# Patient Record
Sex: MALE | Race: OTHER | Employment: OTHER | URBAN - METROPOLITAN AREA
[De-identification: names, ages, dates, MRNs, and addresses within clinical notes are randomized per-mention and may not be internally consistent; named-entity substitution may affect disease eponyms.]

---

## 2022-10-11 ENCOUNTER — HOSPITAL ENCOUNTER (OUTPATIENT)
Dept: GENERAL RADIOLOGY | Age: 72
Discharge: HOME | End: 2022-10-11
Payer: MEDICARE

## 2022-10-11 ENCOUNTER — TELEPHONE (OUTPATIENT)
Dept: FAMILY MEDICINE CLINIC | Age: 72
End: 2022-10-11

## 2022-10-11 ENCOUNTER — OFFICE VISIT (OUTPATIENT)
Dept: FAMILY MEDICINE CLINIC | Age: 72
End: 2022-10-11
Payer: MEDICARE

## 2022-10-11 VITALS
WEIGHT: 156 LBS | OXYGEN SATURATION: 98 % | BODY MASS INDEX: 24.48 KG/M2 | DIASTOLIC BLOOD PRESSURE: 80 MMHG | HEART RATE: 68 BPM | SYSTOLIC BLOOD PRESSURE: 138 MMHG | HEIGHT: 67 IN

## 2022-10-11 DIAGNOSIS — R05.1 ACUTE COUGH: Primary | ICD-10-CM

## 2022-10-11 DIAGNOSIS — R05.1 ACUTE COUGH: ICD-10-CM

## 2022-10-11 PROCEDURE — 71046 X-RAY EXAM CHEST 2 VIEWS: CPT

## 2022-10-11 PROCEDURE — G8484 FLU IMMUNIZE NO ADMIN: HCPCS | Performed by: FAMILY MEDICINE

## 2022-10-11 PROCEDURE — 3017F COLORECTAL CA SCREEN DOC REV: CPT | Performed by: FAMILY MEDICINE

## 2022-10-11 PROCEDURE — G8427 DOCREV CUR MEDS BY ELIG CLIN: HCPCS | Performed by: FAMILY MEDICINE

## 2022-10-11 PROCEDURE — G8420 CALC BMI NORM PARAMETERS: HCPCS | Performed by: FAMILY MEDICINE

## 2022-10-11 PROCEDURE — 1036F TOBACCO NON-USER: CPT | Performed by: FAMILY MEDICINE

## 2022-10-11 PROCEDURE — 99203 OFFICE O/P NEW LOW 30 MIN: CPT | Performed by: FAMILY MEDICINE

## 2022-10-11 PROCEDURE — 1123F ACP DISCUSS/DSCN MKR DOCD: CPT | Performed by: FAMILY MEDICINE

## 2022-10-11 RX ORDER — TAMSULOSIN HYDROCHLORIDE 0.4 MG/1
0.4 CAPSULE ORAL DAILY
COMMUNITY

## 2022-10-11 RX ORDER — ATORVASTATIN CALCIUM 20 MG/1
20 TABLET, FILM COATED ORAL DAILY
COMMUNITY

## 2022-10-11 SDOH — ECONOMIC STABILITY: FOOD INSECURITY: WITHIN THE PAST 12 MONTHS, YOU WORRIED THAT YOUR FOOD WOULD RUN OUT BEFORE YOU GOT MONEY TO BUY MORE.: NEVER TRUE

## 2022-10-11 SDOH — ECONOMIC STABILITY: FOOD INSECURITY: WITHIN THE PAST 12 MONTHS, THE FOOD YOU BOUGHT JUST DIDN'T LAST AND YOU DIDN'T HAVE MONEY TO GET MORE.: NEVER TRUE

## 2022-10-11 ASSESSMENT — PATIENT HEALTH QUESTIONNAIRE - PHQ9
SUM OF ALL RESPONSES TO PHQ QUESTIONS 1-9: 0
1. LITTLE INTEREST OR PLEASURE IN DOING THINGS: 0
SUM OF ALL RESPONSES TO PHQ9 QUESTIONS 1 & 2: 0
SUM OF ALL RESPONSES TO PHQ QUESTIONS 1-9: 0
2. FEELING DOWN, DEPRESSED OR HOPELESS: 0
SUM OF ALL RESPONSES TO PHQ QUESTIONS 1-9: 0
SUM OF ALL RESPONSES TO PHQ QUESTIONS 1-9: 0

## 2022-10-11 ASSESSMENT — SOCIAL DETERMINANTS OF HEALTH (SDOH): HOW HARD IS IT FOR YOU TO PAY FOR THE VERY BASICS LIKE FOOD, HOUSING, MEDICAL CARE, AND HEATING?: NOT HARD AT ALL

## 2022-10-11 NOTE — TELEPHONE ENCOUNTER
Pt rc.  Normal Chest X-Ray labs given to pt. Pt requesting to speak to Clinical Staff but no one answered. Please call pt.

## 2022-10-11 NOTE — PROGRESS NOTES
Mohsen Covarrubias (:  1950) is a 81 y.o. male,New patient, here for evaluation of the following chief complaint(s):  New Patient (Patient needs new PCP, cough x 1 month (taking \"cough syrup), complains of chest congestion he can \"hear\" when he coughs.) and Colonoscopy (Dr. Eddy Pizano in [facility] (approx. 3 years ago))         ASSESSMENT/PLAN:  Sarkis was seen today for new patient and colonoscopy. Diagnoses and all orders for this visit:    Acute cough  -     XR CHEST (2 VW); Future  Reviewed diagnosis of bronchitis and differential diagnosis, including post infectious cough. Prescription medications for symptom relief reviewed, as well as their possible side effects and adverse effects. Supportive care including rest and otc treatments (honey, lemon, tea, humidified air) reviewed. Call if worsening cough or chest pain, fever, chills, or myalgias develop. No follow-ups on file. Subjective   SUBJECTIVE/OBJECTIVE:  HPI  Pt is a of 81 y.o. male comes in today with   Chief Complaint   Patient presents with    New Patient     Patient needs new PCP, cough x 1 month (taking \"cough syrup), complains of chest congestion he can \"hear\" when he coughs. Colonoscopy     Dr. Eddy Pizano in [facility] (approx. 3 years ago)     For the past month has had a lot of cough  Started with runny nose  No sore throat. No sputum. No shortness of breath. On statin for hyperlipidemia  Bph stable on flomax.     Vitals:    10/11/22 0845   BP: 138/80   Site: Left Upper Arm   Position: Sitting   Cuff Size: Small Adult   Pulse: 68   SpO2: 98%   Weight: 156 lb (70.8 kg)   Height: 5' 7\" (1.702 m)     No Known Allergies    Current Outpatient Medications on File Prior to Visit   Medication Sig Dispense Refill    atorvastatin (LIPITOR) 20 MG tablet Take 20 mg by mouth daily      tamsulosin (FLOMAX) 0.4 MG capsule Take 0.4 mg by mouth daily       No current facility-administered medications on file prior to visit. Past Medical History:   Diagnosis Date    Enlarged prostate     Hyperlipidemia        Past Surgical History:   Procedure Laterality Date    SPINE SURGERY  2017       Social History     Socioeconomic History    Marital status: Unknown     Spouse name: None    Number of children: None    Years of education: None    Highest education level: None   Tobacco Use    Smoking status: Never    Smokeless tobacco: Never   Vaping Use    Vaping Use: Never used   Substance and Sexual Activity    Alcohol use: Never    Drug use: Never     Social Determinants of Health     Financial Resource Strain: Low Risk     Difficulty of Paying Living Expenses: Not hard at all   Food Insecurity: No Food Insecurity    Worried About Running Out of Food in the Last Year: Never true    Ran Out of Food in the Last Year: Never true       Social History     Substance and Sexual Activity   Drug Use Never       History reviewed. No pertinent family history. Review of Systems       Objective   Physical Exam  Constitutional:       General: He is not in acute distress. Appearance: Normal appearance. He is well-developed. He is not diaphoretic. HENT:      Head: Normocephalic and atraumatic. Mouth/Throat:      Mouth: Mucous membranes are moist.      Pharynx: Oropharynx is clear. Eyes:      General: No scleral icterus. Neck:      Thyroid: No thyromegaly. Trachea: No tracheal deviation. Cardiovascular:      Rate and Rhythm: Normal rate and regular rhythm. Heart sounds: Normal heart sounds. No murmur heard. Pulmonary:      Effort: Pulmonary effort is normal.      Breath sounds: Normal breath sounds. No wheezing or rales. Musculoskeletal:      Cervical back: Normal range of motion and neck supple. Lymphadenopathy:      Head:      Right side of head: No submental or submandibular adenopathy. Left side of head: No submental or submandibular adenopathy. Cervical: No cervical adenopathy.    Skin: General: Skin is warm and dry. Neurological:      Mental Status: He is alert and oriented to person, place, and time. Cranial Nerves: No cranial nerve deficit. Psychiatric:         Behavior: Behavior normal.         Thought Content: Thought content normal.         Judgment: Judgment normal.            An electronic signature was used to authenticate this note.     --Melissa Hutchinson MD

## 2022-10-12 NOTE — TELEPHONE ENCOUNTER
Patient just needed to know if he needed to schedule a follow up, informed him not at this time. Advised him to call back if there was anything we could help with.

## 2022-11-29 ENCOUNTER — OFFICE VISIT (OUTPATIENT)
Dept: ORTHOPEDIC SURGERY | Age: 72
End: 2022-11-29
Payer: MEDICARE

## 2022-11-29 VITALS — HEIGHT: 67 IN | WEIGHT: 156 LBS | BODY MASS INDEX: 24.48 KG/M2

## 2022-11-29 DIAGNOSIS — M25.552 LEFT HIP PAIN: ICD-10-CM

## 2022-11-29 DIAGNOSIS — M54.50 CHRONIC LOW BACK PAIN WITHOUT SCIATICA, UNSPECIFIED BACK PAIN LATERALITY: ICD-10-CM

## 2022-11-29 DIAGNOSIS — G89.29 CHRONIC LOW BACK PAIN WITHOUT SCIATICA, UNSPECIFIED BACK PAIN LATERALITY: ICD-10-CM

## 2022-11-29 DIAGNOSIS — Z98.890 HISTORY OF LUMBAR SURGERY: Primary | ICD-10-CM

## 2022-11-29 PROCEDURE — G8484 FLU IMMUNIZE NO ADMIN: HCPCS | Performed by: PHYSICIAN ASSISTANT

## 2022-11-29 PROCEDURE — G8420 CALC BMI NORM PARAMETERS: HCPCS | Performed by: PHYSICIAN ASSISTANT

## 2022-11-29 PROCEDURE — 3017F COLORECTAL CA SCREEN DOC REV: CPT | Performed by: PHYSICIAN ASSISTANT

## 2022-11-29 PROCEDURE — 99204 OFFICE O/P NEW MOD 45 MIN: CPT | Performed by: PHYSICIAN ASSISTANT

## 2022-11-29 PROCEDURE — G8427 DOCREV CUR MEDS BY ELIG CLIN: HCPCS | Performed by: PHYSICIAN ASSISTANT

## 2022-11-29 PROCEDURE — 1036F TOBACCO NON-USER: CPT | Performed by: PHYSICIAN ASSISTANT

## 2022-11-29 PROCEDURE — 1123F ACP DISCUSS/DSCN MKR DOCD: CPT | Performed by: PHYSICIAN ASSISTANT

## 2022-11-29 RX ORDER — METHYLPREDNISOLONE 4 MG/1
TABLET ORAL
Qty: 1 KIT | Refills: 0 | Status: SHIPPED | OUTPATIENT
Start: 2022-11-29

## 2022-11-29 NOTE — PROGRESS NOTES
New Patient: SPINE    11/29/2022     CHIEF COMPLAINT:    Chief Complaint   Patient presents with    New Patient     LUMBAR/REF BY WIFE       HISTORY OF PRESENT ILLNESS:              The patient is a 81 y.o. male referred by . Eddie Edge whom is also my patient for chronic back pain. He has a history of a posterior L4-5 fusion in 2015 in [place]. He reports chronic residual left greater than right low back pain. Periodically he will experience some radiating pain into his left lower extremity. His symptoms have increased over the last 1 year. His pain is constant but increased with walking, bending or lifting. Some relief with changing position. Conservative care includes NSAIDs, remote SHANICE in December 2021 with some improvement in [place]. He currently denies any distal radiating pain. Denies any progressive numbness tingling or extremity weakness. No recent bowel or bladder dysfunction or saddle anesthesia. He reports chronic urinary issues due to prostate enlargement over the last few years. No recent fevers chills or infections. No recent injury or trauma. Past Medical History:   Diagnosis Date    Enlarged prostate     Hyperlipidemia         The pain assessment was noted & reviewed in the medical record today.      Current/Past Treatment:   Physical Therapy: past  Chiropractic:     Injection:   SHANICE 12/2021 in [place] w/relief   Medications:            NSAIDS: IBU            Muscle relaxer:              Steriods:              Neuropathic medications:              Opioids:            Other:   Surgery/Consult: Status post L4-5 fusion 2015 in [place]    Work Status/Functionality: Retired     Past Medical History: Medical history form was reviewed today & scanned into the media tab  Past Medical History:   Diagnosis Date    Enlarged prostate     Hyperlipidemia       Past Surgical History:     Past Surgical History:   Procedure Laterality Date    SPINE SURGERY  2017     Current Medications: Current Outpatient Medications:     atorvastatin (LIPITOR) 20 MG tablet, Take 20 mg by mouth daily, Disp: , Rfl:     tamsulosin (FLOMAX) 0.4 MG capsule, Take 0.4 mg by mouth daily, Disp: , Rfl:   Allergies:  Patient has no known allergies. Social History:    reports that he has never smoked. He has never used smokeless tobacco. He reports that he does not drink alcohol and does not use drugs. Family History:   No family history on file. REVIEW OF SYSTEMS: Full ROS reviewed & scanned into chart  CONSTITUTIONAL: Denies unexplained weight loss, fevers   SKIN: Denies active skin conditions   ENT: Denies dizziness, nosebleeds  RESPIRATORY: Denies difficulty breathing  CARDIOVASCULAR: Denies new chest pain   NEUROLOGICAL: Denies progressive weakness  PSYCHOLOGICAL: Denies anxiety, depression   HEMATOLOGIC: Denies blood disorders, cancer  ENDOCRINE: Denies excessive thirst, heat/cold  GI: Denies current nausea, vomiting, diarrhea   : Denies new bowel or bladder incontinence       PHYSICAL EXAM:    Vitals: Height 5' 7\" (1.702 m), weight 156 lb (70.8 kg). Pain score 3/10, seated    GENERAL EXAM:  General Apparence: Patient is adequately groomed with no evidence of malnutrition. Orientation: The patient is oriented to time, place and person. Mood & Affect:The patient's mood and affect are appropriate   Vascular: Examination reveals no swelling tenderness in upper or lower extremities. Good capillary refill  Lymphatic: The lymphatic examination bilaterally reveals all areas to be without enlargement or induration  Sensation: Sensation is intact without deficit  Coordination/Balance: Good coordination     LUMBAR/SACRAL EXAMINATION:  Inspection: Local inspection shows no step-off or bruising. Lumbar alignment is normal.  Sagittal and Coronal balance is neutral.      Palpation:   Tenderness right and left of midline at L5-S1.   Positive bilateral SI tenderness  Range of Motion: Intact flexion moderate to severely limited extension more pain with extension  Strength:   Strength testing is 5/5 in all muscle groups tested. Special Tests:   Straight leg raise and crossed SLR negative. Leg length and pelvis level.  0 out of 5 Haley's signs. Skin: There are no rashes, ulcerations or lesions. Reflexes: Reflexes are symmetrically 2+ at the patellar and ankle tendons. Clonus absent bilaterally at the feet. Gait & station: Slightly forward unassisted  Additional Examinations:   RIGHT LOWER EXTREMITY: Inspection/examination of the right lower extremity does not show any tenderness, deformity or injury. Range of motion is full. There is no gross instability. There are no rashes, ulcerations or lesions. Strength and tone are normal.  LEFT LOWER EXTREMITY:  Inspection/examination of the left lower extremity does not show any tenderness, deformity or injury. Range of motion is full. There is no gross instability. There are no rashes, ulcerations or lesions. Strength and tone are normal.    Diagnostic Testin views lumbar spine 2022 L4-5 posterior fusion, L5-S1 DDD/spondylosis. No high-grade instability on flexion-extension. Questionable sclerotic rounded lesion left superior pubic ramus--we will order dedicated hip film    Chest x-ray 2022 showed no acute abnormality of the chest    PCP notes reviewed        Impression:  1) Chronic worsening LB/buttock pain, L>R  2) L5-S1 DDD  3) S/p L4-5 fusion-2015 in [address]:   1) We had a long discussion. We obtained and reviewed 4 views of his lumbar spine today and discussed in detail. Given chronic worsening pain I recommend lumbar MRI W&WO for further evaluation. 2) Left hip xrays reference left superior pubic ramus  3) MDP--d/c NSAIDs during.   Side effects/risk discussed  4) PT for HEP review  5) Discussed concerning signs and symptoms  6) SHANICE pamphlet provided  7) F/u to review L MRI t/c [address] SHANICE #1 if warranted            Elana Harris ROULA, MPAS  Board Certified by the [address]

## 2022-12-02 ENCOUNTER — HOSPITAL ENCOUNTER (OUTPATIENT)
Dept: MRI IMAGING | Age: 72
Discharge: HOME | End: 2022-12-02
Payer: MEDICARE

## 2022-12-02 DIAGNOSIS — M54.50 CHRONIC LOW BACK PAIN WITHOUT SCIATICA, UNSPECIFIED BACK PAIN LATERALITY: ICD-10-CM

## 2022-12-02 DIAGNOSIS — G89.29 CHRONIC LOW BACK PAIN WITHOUT SCIATICA, UNSPECIFIED BACK PAIN LATERALITY: ICD-10-CM

## 2022-12-02 DIAGNOSIS — Z98.890 HISTORY OF LUMBAR SURGERY: ICD-10-CM

## 2022-12-02 PROCEDURE — 72158 MRI LUMBAR SPINE W/O & W/DYE: CPT

## 2022-12-02 PROCEDURE — 6360000004 HC RX CONTRAST MEDICATION: Performed by: PHYSICIAN ASSISTANT

## 2022-12-02 PROCEDURE — 2580000003 HC RX 258: Performed by: PHYSICIAN ASSISTANT

## 2022-12-02 PROCEDURE — A9579 GAD-BASE MR CONTRAST NOS,1ML: HCPCS | Performed by: PHYSICIAN ASSISTANT

## 2022-12-02 RX ORDER — SODIUM CHLORIDE 0.9 % (FLUSH) 0.9 %
5-40 SYRINGE (ML) INJECTION 2 TIMES DAILY
Status: DISCONTINUED | OUTPATIENT
Start: 2022-12-02 | End: 2022-12-03 | Stop reason: HOSPADM

## 2022-12-02 RX ADMIN — GADOTERIDOL 14 ML: 279.3 INJECTION, SOLUTION INTRAVENOUS at 10:33

## 2022-12-02 RX ADMIN — SODIUM CHLORIDE, PRESERVATIVE FREE 10 ML: 5 INJECTION INTRAVENOUS at 10:34

## 2022-12-05 ENCOUNTER — TELEPHONE (OUTPATIENT)
Dept: ORTHOPEDIC SURGERY | Age: 72
End: 2022-12-05

## 2022-12-05 ENCOUNTER — HOSPITAL ENCOUNTER (OUTPATIENT)
Dept: GENERAL RADIOLOGY | Age: 72
Discharge: HOME | End: 2022-12-05
Payer: MEDICARE

## 2022-12-05 DIAGNOSIS — M25.552 LEFT HIP PAIN: ICD-10-CM

## 2022-12-05 PROCEDURE — 73502 X-RAY EXAM HIP UNI 2-3 VIEWS: CPT

## 2022-12-05 NOTE — TELEPHONE ENCOUNTER
Called & left a voicemail for the patient informing him I was calling to let him know we got his MRI results, and wanted to get him scheduled for a follow up appointment. Patient may call back to schedule at [phone].

## 2022-12-07 ENCOUNTER — OFFICE VISIT (OUTPATIENT)
Dept: ORTHOPEDIC SURGERY | Age: 72
End: 2022-12-07
Payer: MEDICARE

## 2022-12-07 VITALS — WEIGHT: 156 LBS | BODY MASS INDEX: 24.48 KG/M2 | HEIGHT: 67 IN

## 2022-12-07 DIAGNOSIS — Z98.1 HISTORY OF LUMBAR FUSION: Primary | ICD-10-CM

## 2022-12-07 DIAGNOSIS — G89.29 CHRONIC BILATERAL LOW BACK PAIN WITH LEFT-SIDED SCIATICA: ICD-10-CM

## 2022-12-07 DIAGNOSIS — M48.062 LUMBAR STENOSIS WITH NEUROGENIC CLAUDICATION: ICD-10-CM

## 2022-12-07 DIAGNOSIS — M54.42 CHRONIC BILATERAL LOW BACK PAIN WITH LEFT-SIDED SCIATICA: ICD-10-CM

## 2022-12-07 PROCEDURE — 99214 OFFICE O/P EST MOD 30 MIN: CPT | Performed by: PHYSICIAN ASSISTANT

## 2022-12-07 PROCEDURE — 3017F COLORECTAL CA SCREEN DOC REV: CPT | Performed by: PHYSICIAN ASSISTANT

## 2022-12-07 PROCEDURE — G8420 CALC BMI NORM PARAMETERS: HCPCS | Performed by: PHYSICIAN ASSISTANT

## 2022-12-07 PROCEDURE — G8484 FLU IMMUNIZE NO ADMIN: HCPCS | Performed by: PHYSICIAN ASSISTANT

## 2022-12-07 PROCEDURE — 1036F TOBACCO NON-USER: CPT | Performed by: PHYSICIAN ASSISTANT

## 2022-12-07 PROCEDURE — G8427 DOCREV CUR MEDS BY ELIG CLIN: HCPCS | Performed by: PHYSICIAN ASSISTANT

## 2022-12-07 PROCEDURE — 1123F ACP DISCUSS/DSCN MKR DOCD: CPT | Performed by: PHYSICIAN ASSISTANT

## 2022-12-07 NOTE — TELEPHONE ENCOUNTER
Medication:   Requested Prescriptions     Pending Prescriptions Disp Refills    tamsulosin (FLOMAX) 0.4 MG capsule 90 capsule 0     Sig: Take 1 capsule by mouth daily        Last Filled:  ---------    Patient Phone Number: [phone] (home)     Last appt: 10/11/2022   Next appt: Visit date not found    Last OARRS: No flowsheet data found.

## 2022-12-07 NOTE — LETTER
[facility]    Please Schedule the following with: NELDA    Today's Date:  12/7/22     Patient: Shanna Murry     YOB: 1950    Patient Home Phone: [phone] (home)    Diagnosis: L4-5 fusion, severe central stenosis L3-4, left lumbar radiculitis    [x]LT     []RT     []CHANDRAKANT     []Midline    Levels: L3-4 #1    []Cervical SHANICE 95719, 04789  []L-MBB 70250, 45658  []SI Joint 55117   []C-FACET 86872, 38570, 59039  []L-FACET [id number], 37523  []Interlaminar SHANICE 46128     []HIP 32004    []C-MBB  [x]Transforaminal SHANICE 59764  []Neurotomy 88290, 30914, 42876    Attending Provider: Nadege Jacome PA-C    Injection Schedule for: At:   [address] [place] [address],[facility] Floor:                                               Pre-cert #:  Second Insurance:                 Pre-cert #:    Comments:  Follow up with FLORINA Bowen 2weeks after procedure: [phone]    SEDATION:       [] IV           [] ORAL     [] Blood Thinner:                 []Diabetic           []Antibiotic:               []Glaucoma:    [] Pacemaker/defib       [] Current Open Wounds, Lacerations or Sores     Allergies: No Known Allergies    Past Medical History:   Diagnosis Date    Enlarged prostate     Hyperlipidemia           Current Outpatient Medications:     methylPREDNISolone (MEDROL, ILIR,) 4 MG tablet, Take by mouth., Disp: 1 kit, Rfl: 0    atorvastatin (LIPITOR) 20 MG tablet, Take 20 mg by mouth daily, Disp: , Rfl:     tamsulosin (FLOMAX) 0.4 MG capsule, Take 0.4 mg by mouth daily, Disp: , Rfl:

## 2022-12-07 NOTE — PROGRESS NOTES
FOLLOW UP: SPINE    12/7/2022     CHIEF COMPLAINT:    Chief Complaint   Patient presents with    Follow-up     MRI & XR RESULTS       HISTORY OF PRESENT ILLNESS:              The patient is a 81 y.o. male here to review lumbar MRI for chronic worsening back pain. He has a history of a posterior L4-5 fusion in 2015 in [place]. He reports chronic residual left > right low back pain. Periodically he will experience some radiating pain into his left lower extremity. His symptoms have increased over the last 1 year. His pain is constant but increased with walking, bending or lifting. Some relief with changing position. Conservative care includes MDP, NSAIDs, remote SHANICE in December 2021 with some improvement in [place]. He does report some improvement at this time but still with daily pain limiting him functionally. He currently denies any contralateral radiating pain. Denies any progressive numbness tingling or extremity weakness. No recent bowel or bladder dysfunction or saddle anesthesia. He reports chronic urinary issues due to prostate enlargement over the last few years. No recent fevers chills or infections. No recent injury or trauma. Past Medical History:   Diagnosis Date    Enlarged prostate     Hyperlipidemia         The pain assessment was noted & reviewed in the medical record today.      Current/Past Treatment:   Physical Therapy: past  Chiropractic:     Injection:   SHANICE 12/2021 in [place] w/relief   Medications:            NSAIDS: IBU            Muscle relaxer:              Steriods: MDP with some improvement            Neuropathic medications:              Opioids:            Other:   Surgery/Consult: Status post L4-5 fusion 2015 in [place]    Work Status/Functionality: Retired     Past Medical History: Medical history form was reviewed today & scanned into the media tab  Past Medical History:   Diagnosis Date    Enlarged prostate     Hyperlipidemia       Past Surgical History: Past Surgical History:   Procedure Laterality Date    SPINE SURGERY  2017     Current Medications:     Current Outpatient Medications:     methylPREDNISolone (MEDROL, ILIR,) 4 MG tablet, Take by mouth., Disp: 1 kit, Rfl: 0    atorvastatin (LIPITOR) 20 MG tablet, Take 20 mg by mouth daily, Disp: , Rfl:     tamsulosin (FLOMAX) 0.4 MG capsule, Take 0.4 mg by mouth daily, Disp: , Rfl:   Allergies:  Patient has no known allergies. Social History:    reports that he has never smoked. He has never used smokeless tobacco. He reports that he does not drink alcohol and does not use drugs. Family History:   No family history on file. REVIEW OF SYSTEMS: Full ROS reviewed & scanned into chart  CONSTITUTIONAL: Denies unexplained weight loss, fevers   SKIN: Denies active skin conditions          PHYSICAL EXAM:    Vitals: Height 5' 7\" (1.702 m), weight 156 lb (70.8 kg). Pain score 0/10, seated    GENERAL EXAM:  General Apparence: Patient is adequately groomed with no evidence of malnutrition. Orientation: The patient is oriented to time, place and person. Mood & Affect:The patient's mood and affect are appropriate   Lymphatic: The lymphatic examination bilaterally reveals all areas to be without enlargement or induration  Sensation: Sensation is intact without deficit  Coordination/Balance: Good coordination     LUMBAR/SACRAL EXAMINATION:  Inspection: Local inspection shows no step-off or bruising. Lumbar alignment is normal.  Sagittal and Coronal balance is neutral.      Palpation: No focal tenderness today  Range of Motion: Intact flexion moderate to severely limited extension more pain with extension  Strength:   Strength testing is 5/5 in all muscle groups tested. Special Tests:   Straight leg raise and crossed SLR negative. Leg length and pelvis level.  0 out of 5 Haley's signs. Skin: There are no rashes, ulcerations or lesions. Reflexes: Reflexes are symmetrically 2+ at the patellar and ankle tendons. Clonus absent bilaterally at the feet. Gait & station: Slightly forward unassisted  Additional Examinations:   RIGHT LOWER EXTREMITY: Inspection/examination of the right lower extremity does not show any tenderness, deformity or injury. Range of motion is full. There is no gross instability. There are no rashes, ulcerations or lesions. Strength and tone are normal.  LEFT LOWER EXTREMITY:  Inspection/examination of the left lower extremity does not show any tenderness, deformity or injury. Range of motion is full. There is no gross instability. There are no rashes, ulcerations or lesions. Strength and tone are normal.    Diagnostic Testing:    Lumbar MRI scan report dependently reviewed showing L4-5 fusion, L3-4 DDD and disc bulging contributing to severe central stenosis and moderate to severe left foraminal stenosis    Left hip x-ray films and report independently reviewed 2022 shows mild hip OA    4 views lumbar spine 2022 L4-5 posterior fusion, L5-S1 DDD/spondylosis. No high-grade instability on flexion-extension. Questionable sclerotic rounded lesion left superior pubic ramus--we will order dedicated hip film    Chest x-ray 2022 showed no acute abnormality of the chest    PCP notes reviewed        Impression:  1) Chronic worsening LB/buttock pain, left lumbar radiculitis   2) Severe CS w/left FS L3-4  3) S/p L4-5 fusion- in [address] [place] [address]  4) remote SHANICE w/relief         Plan:   1) We reviewed his recent lumbar MRI and hip x-rays today and discussed conservative versus surgical treatment options in detail. He has elected to pursue a treatment course includin) Left L3-4 TX SHANICE #1. Procedure risk and benefits were discussed. Pamphlet provided for more information. We will order with NELDA.   3) PT for above, flexion based  4) Discussed concerning signs and symptoms  5) F/u 2wks after SHANICE for re-evaluation          Kevyn Cruz PA-C, MPAS  Board Certified by the [facility] and [address]

## 2022-12-08 RX ORDER — TAMSULOSIN HYDROCHLORIDE 0.4 MG/1
0.4 CAPSULE ORAL DAILY
Qty: 90 CAPSULE | Refills: 0 | Status: SHIPPED | OUTPATIENT
Start: 2022-12-08

## 2022-12-14 ENCOUNTER — HOSPITAL ENCOUNTER (OUTPATIENT)
Dept: PHYSICAL THERAPY | Age: 72
Setting detail: THERAPIES SERIES
Discharge: HOME | End: 2022-12-14
Payer: MEDICARE

## 2022-12-14 PROCEDURE — 97161 PT EVAL LOW COMPLEX 20 MIN: CPT

## 2022-12-14 PROCEDURE — 97112 NEUROMUSCULAR REEDUCATION: CPT

## 2022-12-14 PROCEDURE — 97110 THERAPEUTIC EXERCISES: CPT

## 2022-12-14 NOTE — FLOWSHEET NOTE
[facility], [address] [place] [address], [address] [place] [address]  Phone: [phone]   Fax:     [phone]    Physical Therapy Daily Treatment Note  Date:  2022    Patient Name:  Atif Chacko  \"Stan\"  :  1950  MRN: [id number]  Restrictions/Precautions:    Medical/Treatment Diagnosis Information:  Diagnosis: Z98.1 (ICD-10-CM) - History of lumbar fusion  M48.062 (ICD-10-CM) - Lumbar stenosis with neurogenic claudication  M54.42, G89.29 (ICD-10-CM) - Chronic bilateral low back pain with left-sided sciatica  Treatment Diagnosis: M54.50 Chronic low back pain  Insurance/Certification information:  PT Insurance Information: Medicare  Physician Information:   Medicare  Has the plan of care been signed (Y/N):        []  Yes  [x]  No     Date of Patient follow up with Physician:       Is this a Progress Report:     []  Yes  [x]  No        If Yes:  Date Range for reporting period:  Beginning   Ending    Progress report will be due (10 Rx or 30 days whichever is less):        Recertification will be due (POC Duration  / 90 days whichever is less):          Visit # Insurance Allowable Auth Required   1 Medicare []  Yes []  No        Functional Scale: FOTO Lumbar 40  Date assessed:       Latex Allergy:  [x]NO      []YES  Preferred Language for Healthcare:   [x]English       []other:      Pain level:  3-4/10     SUBJECTIVE:  See eval    OBJECTIVE: See eval  Observation:   Test measurements:      RESTRICTIONS/PRECAUTIONS: Lumbar fusion L4-L5     Exercises/Interventions:   ROM/stretches     LTR X30 bilat 14   [address] 10\"hx5 bilat 12/14   Hamstring stretch 30\"hx3 bilat 12/14 supine with strap   Piriformis stretch 30\"hx3 bilat 12/14 supine knee across chest                       Strengthening                                   Neuro re-education      Glut sets 5\"hx10 14 hook lying   TA iso 5\"hx10   hook lying            Manual Intervention             Prone PA      GISTM/STM      Lumbar Manip      SI Manip      Hip belt mobs      Hip LA distraction              Therapeutic Exercise and NMR EXR  [x] (86775) Provided verbal/tactile cueing for activities related to strengthening, flexibility, endurance, ROM  for improvements in proximal hip and core control with self care, mobility, lifting and ambulation.  [] (82559) Provided verbal/tactile cueing for activities related to improving balance, coordination, kinesthetic sense, posture, motor skill, proprioception  to assist with core control in self care, mobility, lifting, and ambulation.      Therapeutic Activities:    [x] (34271 or 85900) Provided verbal/tactile cueing for activities related to improving balance, coordination, kinesthetic sense, posture, motor skill, proprioception and motor activation to allow for proper function  with self care and ADLs  [] (03426) Provided training and instruction to the patient for proper core and proximal hip recruitment and positioning with ambulation re-education     Home Exercise Program:    [x] (80255) Reviewed/Progressed HEP activities related to strengthening, flexibility, endurance, ROM of core, proximal hip and LE for functional self-care, mobility, lifting and ambulation   [] (23571) Reviewed/Progressed HEP activities related to improving balance, coordination, kinesthetic sense, posture, motor skill, proprioception of core, proximal hip and LE for self care, mobility, lifting, and ambulation      Manual Treatments:  PROM / STM / Oscillations-Mobs:  G-I, II, III, IV (PA's, Inf., Post.)  [] (87035) Provided manual therapy to mobilize proximal hip and LS spine soft tissue/joints for the purpose of modulating pain, promoting relaxation,  increasing ROM, reducing/eliminating soft tissue swelling/inflammation/restriction, improving soft tissue extensibility and allowing for proper ROM for normal function with self care, mobility, lifting and ambulation. Modalities:       Charges:  Timed Code Treatment Minutes: 23'+eval   Total Treatment Minutes: 10:55-12:14       [x] EVAL (LOW) 00028 (typically 20 minutes face-to-face)  [] EVAL (MOD) 26077 (typically 30 minutes face-to-face)  [] EVAL (HIGH) 17889 (typically 45 minutes face-to-face)  [] RE-EVAL     [x] IX(13114) x 1    [] IONTO  [x] NMR (29260) x  1   [] VASO  [] Manual (79232) x      [] Other:  [] TA x      [] Mech Traction (18375)  [] ES(attended) (08992)      [] ES (un) (47243):     Goals:   Patient stated goal: Move and complete daily activities without pain     [] Progressing: [] Met: [] Not Met: [] Adjusted     Therapist goals for Patient:   Short Term Goals: To be achieved in: 2 weeks  1. Independent in HEP and progression per patient tolerance, in order to prevent re-injury. [] Progressing: [] Met: [] Not Met: [] Adjusted   2. Patient will have a decrease in pain to facilitate improvement in movement, function, and ADLs as indicated by Functional Deficits. [] Progressing: [] Met: [] Not Met: [] Adjusted     Long Term Goals: To be achieved in: 4 weeks  1. Patient will score 55 or higher FOTO lumbar spine indicating subject improvement in function   [] Progressing: [] Met: [] Not Met: [] Adjusted  2. Patient will demonstrate increased trunk AROM to 20 deg extension in order to return from bent over positions without increase in pain or limitations. [] Progressing: [] Met: [] Not Met: [] Adjusted  3. Patient will demonstrate an increase in hip strength to 4/5 bilaterally in order to go from sit to stand with proper sequencing and no increase in symptoms. [] Progressing: [] Met: [] Not Met: [] Adjusted  4. Patient will demonstrate trunk AROM that is 100% of normal and pain free with rotations and side bends in order to complete bed mobility without pain or limitations   [] Progressing: [] Met: [] Not Met: [] Adjusted  5.  Patient will be able to ambulate for 30 minutes without pain or limitations d/t improvements in hip strength and LE flexibility   [] Progressing: [] Met: [] Not Met: [] Adjusted       Overall Progression Towards Functional goals/ Treatment Progress Update:  [] Patient is progressing as expected towards functional goals listed. [] Progression is slowed due to complexities/Impairments listed. [] Progression has been slowed due to co-morbidities. [x] Plan just implemented, too soon to assess goals progression <30days   [] Goals require adjustment due to lack of progress  [] Patient is not progressing as expected and requires additional follow up with physician  [] Other    Prognosis for POC: [x] Good [] Fair  [] Poor      Patient requires continued skilled intervention: [x] Yes  [] No    Treatment/Activity Tolerance:  [x] Patient able to complete treatment  [] Patient limited by fatigue  [] Patient limited by pain     [] Patient limited by other medical complications  [] Other: 12/14 Tolerated exercises well with no adverse effects     Patient education:  12/14: Educated on objective findings and POC. Educate on proper muscle activation and sequencing with sit to stand to decrease stress placed on lumbar spine. HEP instruction:   Access Code: [email or web address]  URL: [email or web address]  Date: 12/14/2022  Prepared by: Dairl Riedel     Exercises  Supine Lower Trunk Rotation - 1-2 x daily - 7 x weekly - 1 sets - 30 reps  Hooklying Single Knee to Chest - 1-2 x daily - 7 x weekly - 3 sets - 10 reps  Hooklying Hamstring Stretch with Strap - 1-2 x daily - 7 x weekly - 1 sets - 3 reps - 30 hold  Supine Piriformis Stretch with Leg Straight - 1-2 x daily - 7 x weekly - 1 sets - 3 reps - 30 hold  Supine Transversus Abdominis Bracing - Hands on Stomach - 1-2 x daily - 7 x weekly - 1 sets - 10 reps - 5 hold  Hooklying Gluteal Sets - 1-2 x daily - 7 x weekly - 1 sets - 10 reps - 5 hold    Prognosis: [x] Good [] Fair  [] Poor    Patient Requires Follow-up: [x] Yes  [] No    PLAN: See eval  [] Continue per plan of care [] Alter current plan (see comments)  [x] Plan of care initiated [] Hold pending MD visit [] Discharge    Electronically signed by: Luc Shane, PT PT, DPT    *If patient does not return for further follow ups after this date. Please consider this as the patients discharge from physical therapy.

## 2022-12-14 NOTE — PLAN OF CARE
[facility] [address]  Phone [phone]  Fax [phone]      Physical Therapy Certification    Dear Deena JOHNS,    We had the pleasure of evaluating the following patient for physical therapy services at [address] [place] [address]. A summary of our findings can be found in the initial assessment below. This includes our plan of care. If you have any questions or concerns regarding these findings, please do not hesitate to contact me at the office phone number checked above. Thank you for the referral.       Physician Signature:_______________________________Date:__________________  By signing above (or electronic signature), therapists plan is approved by physician      Patient: Tiffany Stephens   : 1950   MRN: [id number]  Referring Physician:  NAT Lott       Evaluation Date: 2022      Medical Diagnosis Information:  Diagnosis: Z98.1 (ICD-10-CM) - History of lumbar fusion  M48.062 (ICD-10-CM) - Lumbar stenosis with neurogenic claudication  M54.42, G89.29 (ICD-10-CM) - Chronic bilateral low back pain with left-sided sciatica   Treatment Diagnosis: M54.50 Chronic low back pain                                         Insurance information: PT Insurance Information: Medicare     Precautions/ Contra-indications: Lumbar fusion L4-L5       Latex Allergy:  [x]NO      []YES  Preferred Language for Healthcare:   [x]English       []other:    SUBJECTIVE: Patient arrived to physical therapy with chronic left > right low back pain. Periodically he will experience some radiating pain into his left lower extremity with increase in symptoms. His symptoms have increased over the last 1 year with moving to the area. His pain is constant but increased with walking, bending or lifting. Some relief with changing position.   Conservative care includes MDP, NSAIDs, remote SHANICE in December 2021 with some improvement in [place]. He does report some improvement at this time but still with daily pain limiting him functionally. He currently denies any contralateral radiating pain. Denies any progressive numbness tingling or extremity weakness. No recent bowel or bladder dysfunction or saddle anesthesia. He reports chronic urinary issues due to prostate enlargement over the last few years. No recent fevers chills or infections. No recent injury or trauma. Awaiting approval for Lumbar epidural injection. Leaving for [facility] January 15h for 2 months     Lumbar MRI 12/2/22  1. Laminectomy at L4 with posterior fusion and pedicle screw placement at L4 and at L5.   2. Moderate diffuse disc bulge type herniation at L3-L4 which in combination with severe facet arthropathy and ligamentous hypertrophy results in a severe central canal stenosis. 3. Multifactorial bilateral foraminal narrowing at L3-L4 most pronounced on the left side as described. Additional mild foraminal narrowing at L4-L5 and L5-S1. Relevant Medical History: Lumbar fusion L4-L5 2015. Bilateral knee arthroscopy 2008     C-SSRS Triggered by Intake questionnaire (Past 2 wk assessment):   [x] No, Questionnaire did not trigger screening.   [] Yes, Patient intake triggered further evaluation      [] C-SSRS Screening completed  [] PCP notified via Plan of Care  [] Emergency services notified     Functional Disability Index/G-Codes: FOTO Lumbar 40    Pain Scale: 3-4/10  Easing factors: sitting, sleeping    Provocative factors: Walking, bending, lifting, bed mobility, transitional movement such as getting out of bed, getting in/out of car, sit to stand.  Stairs     Type: [x]Constant           []Intermittent      [x]Radiating         []Localized         []other:                Numbness/Tingling: Denies      Occupation/School: retired     Living Status/Prior Level of Function: Independent with ADLs and IADLs, OBJECTIVE:   ROM   Comments   Trunk flexion 95 deg    Trunk extension 9 deg     Trunk R sidebend 100% of normal Pain returning to neutral    Trunk L sidebend 100% of normal  Pain returning to neutral    Trunk R rotation 90% of normal Pain   Trunk L rotation 75% of normal Pain   HS flexibility 150 L, 148 R Measured 90/90     Arcelia  Moderate restriction L, mild restriction on R                Strength Left Right Comments   Hip flexion(L2) 5 5    Knee extension(L3) 5 5    Knee flexion(S1-2) 5 5    Ankle dorsiflexion(L4) 5 5    Toe extension(L5) 5 5    Ankle eversion/plantar flexion(S1) 5 5    Hip abd   4+ 4+     Hip ext 3 pain 3+ pain        Special tests   Comments   SLR Neg bilat     Slump test Pos bilat      Pelvic symmetry  Neg bilat     Clonus Neg L  Pos R      Babinski  Neg bilat        DTRs Left Right Comments   Patellar(L3-L4) 2+ 2+     Achilles(S1-S2) 2+ 2+                  Joint mobility: lumbar mobility not assessed d/t lumbar fusion               []Normal               []Hypo              []Hyper     Palpation: Tenderness R lumbar paraspinals, spinous process lumbar and sacrum bilateral piriformis. Functional Mobility/Transfers:   SLS: eyes open mild postural sway on R, moderate postural sway on L with pain   Sit to stand: hand on thighs with excessive trunk flexion and mild pain returning to upright position      Posture: Mild forward head, rounded shoulders, decrease lumbar lordosis      Bandages/Dressings/Incisions: N/A     Gait: Independent, mild trendelenburg bilateral hips                          [x] Patient history, allergies, meds reviewed. Medical chart reviewed. See intake form. Review Of Systems (ROS):  [x]Performed Review of systems (Integumentary, CardioPulmonary, Neurological) by intake and observation. Intake form has been scanned into medical record.  Patient has been instructed to contact their primary care physician regarding ROS issues if not already being addressed at this time.       Co-morbidities/Complexities (which will affect course of rehabilitation):   []None              Arthritic conditions   []Rheumatoid arthritis (M05.9)  []Osteoarthritis (M19.91)    Cardiovascular conditions   []Hypertension (I10)  []Hyperlipidemia (E78.5)  []Angina pectoris (I20)  []Atherosclerosis (I70)    Musculoskeletal conditions   []Disc pathology   []Congenital spine pathologies   [x]Prior surgical intervention  []Osteoporosis (M81.8)  []Osteopenia (M85.8)   Endocrine conditions   []Hypothyroid (E03.9)  []Hyperthyroid Gastrointestinal conditions   []Constipation (W66.92)    Metabolic conditions   []Morbid obesity (E66.01)  []Diabetes type 1(E10.65) or 2 (E11.65)   []Neuropathy (G60.9)      Pulmonary conditions   []Asthma (J45)  []Coughing   []COPD (J44.9)    Psychological Disorders  []Anxiety (F41.9)  []Depression (F32.9)   []Other:    []Other:            Barriers to/and or personal factors that will affect rehab potential:              [x]Age  []Sex              []Motivation/Lack of Motivation                        []Co-Morbidities              []Cognitive Function, education/learning barriers              []Environmental, home barriers              []profession/work barriers  [x]past PT/medical experience  []other:  Justification: Pt's age and prior surgical intervention to lumbar spine are potential barriers to treatment      Falls Risk Assessment (30 days):   [x] Falls Risk assessed and no intervention required.   [] Falls Risk assessed and Patient requires intervention due to being higher risk   TUG score (>12s at risk):     [] Falls education provided, including        ASSESSMENT:   Functional Impairments:                []Noted lumbar/proximal hip hypomobility              []Noted lumbosacral and/or generalized hypermobility              [x]Decreased Lumbosacral/hip/LE functional ROM              [x]Decreased core/proximal hip strength and neuromuscular control               [x]Decreased LE functional strength               []Abnormal reflexes/sensation/myotomal/dermatomal deficits  [x]Reduced balance/proprioceptive control               []other:       Functional Activity Limitations (from functional questionnaire and intake)              [x]Reduced ability to tolerate prolonged functional positions              [x]Reduced ability or difficulty with changes of positions or transfers between positions              [x]Reduced ability to maintain good posture and demonstrate good body mechanics with sitting, bending, and lifting              []Reduced ability to sleep              [] Reduced ability or tolerance with driving and/or computer work              [x]Reduced ability to perform lifting, reaching, carrying tasks              [x]Reduced ability to squat              [x]Reduced ability to forward bend              [x]Reduced ability to ambulate prolonged functional periods/distances/surfaces              [x]Reduced ability to ascend/descend stairs              []other:       Participation Restrictions              []Reduced participation in self care activities              [x]Reduced participation in home management activities              [x]Reduced participation in work activities              [x]Reduced participation in social activities. [x]Reduced participation in sport/recreational activities. Classification:              []Signs/symptoms consistent with Lumbar instability/stabilization subgroup. [x]Signs/symptoms consistent with Lumbar mobilization/manipulation subgroup, myotomes and dermatomes intact. Meets manipulation criteria.                []Signs/symptoms consistent with Lumbar direction specific/centralization subgroup              []Signs/symptoms consistent with Lumbar traction subgroup                            []Signs/symptoms consistent with lumbar facet dysfunction              [x]Signs/symptoms consistent with lumbar stenosis type prior to pt leaving for [facility] for 2 months   Interventions:  [x]  Therapeutic exercise including: strength training, ROM, for LE, Glutes and core   [x]  NMR activation and proprioception for glutes , LE and Core   [x]  Manual therapy as indicated for Hip complex, LE and spine to include: Dry Needling/IASTM, STM, PROM, Gr I-IV mobilizations, manipulation. [x]  Modalities as needed that may include: thermal agents, E-stim, Biofeedback, US, iontophoresis as indicated  [x]  Patient education on joint protection, postural re-education, activity modification, progression of HEP. HEP instruction:   Access Code: [email or web address]  URL: [email or web address]  Date: 12/14/2022  Prepared by: Nannette Elizabeth    Exercises  Supine Lower Trunk Rotation - 1-2 x daily - 7 x weekly - 1 sets - 30 reps  Hooklying Single Knee to Chest - 1-2 x daily - 7 x weekly - 3 sets - 10 reps  Hooklying Hamstring Stretch with Strap - 1-2 x daily - 7 x weekly - 1 sets - 3 reps - 30 hold  Supine Piriformis Stretch with Leg Straight - 1-2 x daily - 7 x weekly - 1 sets - 3 reps - 30 hold  Supine Transversus Abdominis Bracing - Hands on Stomach - 1-2 x daily - 7 x weekly - 1 sets - 10 reps - 5 hold  Hooklying Gluteal Sets - 1-2 x daily - 7 x weekly - 1 sets - 10 reps - 5 hold       GOALS:   Patient stated goal: Move and complete daily activities without pain     [] Progressing: [] Met: [] Not Met: [] Adjusted    Therapist goals for Patient:   Short Term Goals: To be achieved in: 2 weeks  1. Independent in HEP and progression per patient tolerance, in order to prevent re-injury. [] Progressing: [] Met: [] Not Met: [] Adjusted   2. Patient will have a decrease in pain to facilitate improvement in movement, function, and ADLs as indicated by Functional Deficits. [] Progressing: [] Met: [] Not Met: [] Adjusted    Long Term Goals: To be achieved in: 4 weeks  1.  Patient will score 55 or higher FOTO lumbar spine indicating subject improvement in function   [] Progressing: [] Met: [] Not Met: [] Adjusted  2. Patient will demonstrate increased trunk AROM to 20 deg extension in order to return from bent over positions without increase in pain or limitations. [] Progressing: [] Met: [] Not Met: [] Adjusted  3. Patient will demonstrate an increase in hip strength to 4/5 bilaterally in order to go from sit to stand with proper sequencing and no increase in symptoms. [] Progressing: [] Met: [] Not Met: [] Adjusted  4. Patient will demonstrate trunk AROM that is 100% of normal and pain free with rotations and side bends in order to complete bed mobility without pain or limitations   [] Progressing: [] Met: [] Not Met: [] Adjusted  5.  Patient will be able to ambulate for 30 minutes without pain or limitations d/t improvements in hip strength and LE flexibility   [] Progressing: [] Met: [] Not Met: [] Adjusted       Electronically signed by: Demond Lowe PT

## 2022-12-16 ENCOUNTER — HOSPITAL ENCOUNTER (OUTPATIENT)
Dept: PHYSICAL THERAPY | Age: 72
Setting detail: THERAPIES SERIES
Discharge: HOME | End: 2022-12-16
Payer: MEDICARE

## 2022-12-16 PROCEDURE — 97112 NEUROMUSCULAR REEDUCATION: CPT

## 2022-12-16 PROCEDURE — 97140 MANUAL THERAPY 1/> REGIONS: CPT

## 2022-12-16 PROCEDURE — 97110 THERAPEUTIC EXERCISES: CPT

## 2022-12-16 NOTE — FLOWSHEET NOTE
The [address] [place] [address] 200, [address], [address] [place] [address]  Phone: [phone]   Fax:     [phone]    Physical Therapy Daily Treatment Note  Date:  2022    Patient Name:  Keena Helms  \"Pradep\"  :  1950  MRN: [id number]  Restrictions/Precautions:    Medical/Treatment Diagnosis Information:  Diagnosis: Z98.1 (ICD-10-CM) - History of lumbar fusion  M48.062 (ICD-10-CM) - Lumbar stenosis with neurogenic claudication  M54.42, G89.29 (ICD-10-CM) - Chronic bilateral low back pain with left-sided sciatica  Treatment Diagnosis: M54.50 Chronic low back pain  Insurance/Certification information:  PT Insurance Information: Medicare  Physician Information:   Medicare  Has the plan of care been signed (Y/N):        []  Yes  [x]  No     Date of Patient follow up with Physician:       Is this a Progress Report:     []  Yes  [x]  No        If Yes:  Date Range for reporting period:  Beginning   Ending    Progress report will be due (10 Rx or 30 days whichever is less):        Recertification will be due (POC Duration  / 90 days whichever is less):          Visit # Insurance Allowable Auth Required   2   Medicare []  Yes []  No        Functional Scale: FOTO Lumbar 40  Date assessed:       Latex Allergy:  [x]NO      []YES  Preferred Language for Healthcare:   [x]English       []other:      Pain level:  1-2/10       SUBJECTIVE:  : : Pt states he continues to feel most of his pain with walking long distances or increase activity level in weight bearing as well as lifting. Not having as much pain at rest today. Completed HEP yesterday with no issues.      OBJECTIVE: See eval  Observation:   Test measurements:      RESTRICTIONS/PRECAUTIONS: Lumbar fusion L4-L5     Exercises/Interventions:   ROM/stretches     DKTC to SB roll ins 10\"hx10  Start     LTR X30 bilat ^ completed with LE resting on SB    SKTC Progressed with DKTC 12/16   Hamstring stretch 30\"hx3 bilat 12/14 supine with strap   Piriformis stretch 30\"hx3 bilat 12/14 supine knee across chest   Lumbar rotation 10\"hx10 bilat Start 12/16 side lying   Prone on elbows 2x10  Start 12/16             Strengthening                                   Neuro re-education      Glut sets 10\"hx10 ^12/16 prone   TA iso 10\"hx10  ^12/16 hook lying   Bridges 3x10 DL Start 12/16        Manual Intervention      STM Bilateral lumbar paraspinals x8' Start 12/16                                         Therapeutic Exercise and NMR EXR  [x] (14738) Provided verbal/tactile cueing for activities related to strengthening, flexibility, endurance, ROM  for improvements in proximal hip and core control with self care, mobility, lifting and ambulation.  [] (86423) Provided verbal/tactile cueing for activities related to improving balance, coordination, kinesthetic sense, posture, motor skill, proprioception  to assist with core control in self care, mobility, lifting, and ambulation.      Therapeutic Activities:    [x] (55291 or 77041) Provided verbal/tactile cueing for activities related to improving balance, coordination, kinesthetic sense, posture, motor skill, proprioception and motor activation to allow for proper function  with self care and ADLs  [] (88881) Provided training and instruction to the patient for proper core and proximal hip recruitment and positioning with ambulation re-education     Home Exercise Program:    [x] (49452) Reviewed/Progressed HEP activities related to strengthening, flexibility, endurance, ROM of core, proximal hip and LE for functional self-care, mobility, lifting and ambulation   [] (34011) Reviewed/Progressed HEP activities related to improving balance, coordination, kinesthetic sense, posture, motor skill, proprioception of core, proximal hip and LE for self care, mobility, lifting, and ambulation      Manual Treatments:  PROM / STM / Oscillations-Mobs:  G-I, II, III, IV (PA's, Inf., Post.)  [] (87753) Provided manual therapy to mobilize proximal hip and LS spine soft tissue/joints for the purpose of modulating pain, promoting relaxation,  increasing ROM, reducing/eliminating soft tissue swelling/inflammation/restriction, improving soft tissue extensibility and allowing for proper ROM for normal function with self care, mobility, lifting and ambulation. Modalities:       Charges:  Timed Code Treatment Minutes: 45'   Total Treatment Minutes: 10:45-11:28  43'       [] EVAL (LOW) 455 1011 (typically 20 minutes face-to-face)  [] EVAL (MOD) 66904 (typically 30 minutes face-to-face)  [] EVAL (HIGH) 82961 (typically 45 minutes face-to-face)  [] RE-EVAL     [x] HN(61223) x 1    [] IONTO  [x] NMR (16602) x  1   [] VASO  [x] Manual (63454) x  1    [] Other:  [] TA x      [] Mech Traction (92426)  [] ES(attended) (70938)      [] ES (un) (52382):     Goals:   Patient stated goal: Move and complete daily activities without pain     [] Progressing: [] Met: [] Not Met: [] Adjusted     Therapist goals for Patient:   Short Term Goals: To be achieved in: 2 weeks  1. Independent in HEP and progression per patient tolerance, in order to prevent re-injury. [] Progressing: [] Met: [] Not Met: [] Adjusted   2. Patient will have a decrease in pain to facilitate improvement in movement, function, and ADLs as indicated by Functional Deficits. [] Progressing: [] Met: [] Not Met: [] Adjusted     Long Term Goals: To be achieved in: 4 weeks  1. Patient will score 55 or higher FOTO lumbar spine indicating subject improvement in function   [] Progressing: [] Met: [] Not Met: [] Adjusted  2. Patient will demonstrate increased trunk AROM to 20 deg extension in order to return from bent over positions without increase in pain or limitations. [] Progressing: [] Met: [] Not Met: [] Adjusted  3.  Patient will demonstrate an increase in hip strength to 4/5 bilaterally in order to go from sit to stand with proper sequencing and no increase in symptoms. [] Progressing: [] Met: [] Not Met: [] Adjusted  4. Patient will demonstrate trunk AROM that is 100% of normal and pain free with rotations and side bends in order to complete bed mobility without pain or limitations   [] Progressing: [] Met: [] Not Met: [] Adjusted  5. Patient will be able to ambulate for 30 minutes without pain or limitations d/t improvements in hip strength and LE flexibility   [] Progressing: [] Met: [] Not Met: [] Adjusted       Overall Progression Towards Functional goals/ Treatment Progress Update:  [] Patient is progressing as expected towards functional goals listed. [] Progression is slowed due to complexities/Impairments listed. [] Progression has been slowed due to co-morbidities. [x] Plan just implemented, too soon to assess goals progression <30days   [] Goals require adjustment due to lack of progress  [] Patient is not progressing as expected and requires additional follow up with physician  [] Other    Prognosis for POC: [x] Good [] Fair  [] Poor      Patient requires continued skilled intervention: [x] Yes  [] No    Treatment/Activity Tolerance:  [x] Patient able to complete treatment  [] Patient limited by fatigue  [] Patient limited by pain     [] Patient limited by other medical complications  [] Other: 12/16 Mild tension bilateral lumbar paraspinals that responded well to manual therapy. Cues required for proper muscle activation with glut sets and TA isometric as well as bridges. Pt also required cues to not hold breath with exercises. Overall responded well to treatment with no adverse effects noted or reported. Patient education:  12/16 Updated HEP   12/14: Educated on objective findings and POC. Educate on proper muscle activation and sequencing with sit to stand to decrease stress placed on lumbar spine.      HEP instruction:   Access Code: [email or web address]      Prognosis: [x] Good [] Fair  [] Poor    Patient Requires Follow-up: [x] Yes  [] No    PLAN: See eval  [x] Continue per plan of care [] Alter current plan (see comments)  [x] Plan of care initiated [] Hold pending MD visit [] Discharge    Electronically signed by: Justyna Mcneal, PT PT, DPT    *If patient does not return for further follow ups after this date. Please consider this as the patients discharge from physical therapy.

## 2022-12-20 ENCOUNTER — HOSPITAL ENCOUNTER (OUTPATIENT)
Dept: PHYSICAL THERAPY | Age: 72
Setting detail: THERAPIES SERIES
Discharge: HOME | End: 2022-12-20
Payer: MEDICARE

## 2022-12-20 PROCEDURE — 97112 NEUROMUSCULAR REEDUCATION: CPT

## 2022-12-20 PROCEDURE — 97110 THERAPEUTIC EXERCISES: CPT

## 2022-12-20 PROCEDURE — 97140 MANUAL THERAPY 1/> REGIONS: CPT

## 2022-12-20 NOTE — FLOWSHEET NOTE
The [facility], [address] [place] [address], [address] [place] [address]  Phone: [phone]   Fax:     [phone]    Physical Therapy Daily Treatment Note  Date:  2022    Patient Name:  Sharan Salinas  \"Pradep\"  :  1950  MRN: [id number]  Restrictions/Precautions:    Medical/Treatment Diagnosis Information:  Diagnosis: Z98.1 (ICD-10-CM) - History of lumbar fusion  M48.062 (ICD-10-CM) - Lumbar stenosis with neurogenic claudication  M54.42, G89.29 (ICD-10-CM) - Chronic bilateral low back pain with left-sided sciatica  Treatment Diagnosis: M54.50 Chronic low back pain  Insurance/Certification information:  PT Insurance Information: Medicare  Physician Information:   Medicare  Has the plan of care been signed (Y/N):        []  Yes  [x]  No     Date of Patient follow up with Physician:       Is this a Progress Report:     []  Yes  [x]  No        If Yes:  Date Range for reporting period:  Beginning   Ending    Progress report will be due (10 Rx or 30 days whichever is less): 3/51       Recertification will be due (POC Duration  / 90 days whichever is less):          Visit # Insurance Allowable Auth Required   3   Medicare []  Yes []  No        Functional Scale: FOTO Lumbar 40  Date assessed:       Latex Allergy:  [x]NO      []YES  Preferred Language for Healthcare:   [x]English       []other:      Pain level:  2-3/10 12/20     SUBJECTIVE:   Pt states feeling about the same overall since last visit. No issues with HEP.  Has been active today causing mild elevation in symptoms    OBJECTIVE: See eval  Observation:   Test measurements:      RESTRICTIONS/PRECAUTIONS: Lumbar fusion L4-L5     Exercises/Interventions:   ROM/stretches     DKTC to SB roll ins 10\"hx10  Start     LTR X30 bilat ^ completed with LE resting on SB    SKTC Progressed with DKTC    Hamstring stretch 30\"hx3 bilat  supine with strap   Piriformis stretch 30\"hx3 bilat 12/14 supine knee across chest  12/20 mild cues for proper direction of stretch    Lumbar rotation 10\"hx10 bilat Start 12/16 side lying   Prone on elbows 3x10  ^12/20             Strengthening                                   Neuro re-education      Glut sets 10\"hx10 ^12/16 prone   TA iso 10\"hx10  ^12/16 hook lying  12/20 cues for proper muscle activation    TA marching 2x10 alt LE Start 12/20   Bridges 5\"h 2x10 DL ^12/20    BKFO Blue TB 5\"hx10 bilat Start 12/20 cues to maintain neutral lumbar spine        Manual Intervention      STM Bilateral lumbar paraspinals x8' 1220                                         Therapeutic Exercise and NMR EXR  [x] (87838) Provided verbal/tactile cueing for activities related to strengthening, flexibility, endurance, ROM  for improvements in proximal hip and core control with self care, mobility, lifting and ambulation.  [] (48381) Provided verbal/tactile cueing for activities related to improving balance, coordination, kinesthetic sense, posture, motor skill, proprioception  to assist with core control in self care, mobility, lifting, and ambulation.      Therapeutic Activities:    [x] (53034 or 02042) Provided verbal/tactile cueing for activities related to improving balance, coordination, kinesthetic sense, posture, motor skill, proprioception and motor activation to allow for proper function  with self care and ADLs  [] (55699) Provided training and instruction to the patient for proper core and proximal hip recruitment and positioning with ambulation re-education     Home Exercise Program:    [x] (09312) Reviewed/Progressed HEP activities related to strengthening, flexibility, endurance, ROM of core, proximal hip and LE for functional self-care, mobility, lifting and ambulation   [] (52178) Reviewed/Progressed HEP activities related to improving balance, coordination, kinesthetic sense, posture, motor skill, proprioception of core, proximal hip and LE for self care, mobility, lifting, and ambulation      Manual Treatments:  PROM / STM / Oscillations-Mobs:  G-I, II, III, IV (PA's, Inf., Post.)  [] (75663) Provided manual therapy to mobilize proximal hip and LS spine soft tissue/joints for the purpose of modulating pain, promoting relaxation,  increasing ROM, reducing/eliminating soft tissue swelling/inflammation/restriction, improving soft tissue extensibility and allowing for proper ROM for normal function with self care, mobility, lifting and ambulation. Modalities:       Charges:  Timed Code Treatment Minutes: 40   Total Treatment Minutes: 4:18-5:02  44'       [] EVAL (LOW) 88734 (typically 20 minutes face-to-face)  [] EVAL (MOD) 07328 (typically 30 minutes face-to-face)  [] EVAL (HIGH) 37487 (typically 45 minutes face-to-face)  [] RE-EVAL     [x] LI(18721) x 1    [] IONTO  [x] NMR (99010) x  1   [] VASO  [x] Manual (39896) x  1    [] Other:  [] TA x      [] Mech Traction (44539)  [] ES(attended) (21194)      [] ES (un) (37877):     Goals:   Patient stated goal: Move and complete daily activities without pain     [] Progressing: [] Met: [] Not Met: [] Adjusted     Therapist goals for Patient:   Short Term Goals: To be achieved in: 2 weeks  1. Independent in HEP and progression per patient tolerance, in order to prevent re-injury. [] Progressing: [] Met: [] Not Met: [] Adjusted   2. Patient will have a decrease in pain to facilitate improvement in movement, function, and ADLs as indicated by Functional Deficits. [] Progressing: [] Met: [] Not Met: [] Adjusted     Long Term Goals: To be achieved in: 4 weeks  1. Patient will score 55 or higher FOTO lumbar spine indicating subject improvement in function   [] Progressing: [] Met: [] Not Met: [] Adjusted  2. Patient will demonstrate increased trunk AROM to 20 deg extension in order to return from bent over positions without increase in pain or limitations.    [] Progressing: [] Met: [] Not Met: [] Adjusted  3. Patient will demonstrate an increase in hip strength to 4/5 bilaterally in order to go from sit to stand with proper sequencing and no increase in symptoms. [] Progressing: [] Met: [] Not Met: [] Adjusted  4. Patient will demonstrate trunk AROM that is 100% of normal and pain free with rotations and side bends in order to complete bed mobility without pain or limitations   [] Progressing: [] Met: [] Not Met: [] Adjusted  5. Patient will be able to ambulate for 30 minutes without pain or limitations d/t improvements in hip strength and LE flexibility   [] Progressing: [] Met: [] Not Met: [] Adjusted       Overall Progression Towards Functional goals/ Treatment Progress Update:  [] Patient is progressing as expected towards functional goals listed. [] Progression is slowed due to complexities/Impairments listed. [] Progression has been slowed due to co-morbidities. [x] Plan just implemented, too soon to assess goals progression <30days   [] Goals require adjustment due to lack of progress  [] Patient is not progressing as expected and requires additional follow up with physician  [] Other    Prognosis for POC: [x] Good [] Fair  [] Poor      Patient requires continued skilled intervention: [x] Yes  [] No    Treatment/Activity Tolerance:  [x] Patient able to complete treatment  [] Patient limited by fatigue  [] Patient limited by pain     [] Patient limited by other medical complications  [] Other: 12/20 Pt continues to have mild tension at lumbar paraspinals that responded well to manual therapy and stretches allowing for decrease in muscle tension. Cues required for proper muscle activation in order to improve stability and decrease stress placed on lumbar spine. Tolerated treatment well with no adverse effects or increase in symptoms. Patient education:  12/20 Updated HEP   12/16 Updated HEP   12/14: Educated on objective findings and POC.  Educate on proper muscle activation and sequencing with sit to stand to decrease stress placed on lumbar spine. HEP instruction:   Access Code: [email or web address]      Prognosis: [x] Good [] Fair  [] Poor    Patient Requires Follow-up: [x] Yes  [] No    PLAN: See eval  [x] Continue per plan of care [] Alter current plan (see comments)  [x] Plan of care initiated [] Hold pending MD visit [] Discharge    Electronically signed by: Amanda Lowe, PT PT, DPT    *If patient does not return for further follow ups after this date. Please consider this as the patients discharge from physical therapy.

## 2022-12-21 ENCOUNTER — HOSPITAL ENCOUNTER (OUTPATIENT)
Dept: INTERVENTIONAL RADIOLOGY/VASCULAR | Age: 72
Discharge: HOME | End: 2022-12-21
Payer: MEDICARE

## 2022-12-21 DIAGNOSIS — M48.062 SPINAL STENOSIS, LUMBAR REGION WITH NEUROGENIC CLAUDICATION: ICD-10-CM

## 2022-12-21 PROCEDURE — 64483 NJX AA&/STRD TFRM EPI L/S 1: CPT

## 2022-12-21 PROCEDURE — 2500000003 HC RX 250 WO HCPCS

## 2022-12-21 PROCEDURE — 62323 NJX INTERLAMINAR LMBR/SAC: CPT

## 2022-12-21 PROCEDURE — 6360000002 HC RX W HCPCS

## 2022-12-21 PROCEDURE — 6360000004 HC RX CONTRAST MEDICATION: Performed by: RADIOLOGY

## 2022-12-21 RX ADMIN — IOHEXOL 10 ML: 180 INJECTION INTRAVENOUS at 11:17

## 2022-12-21 NOTE — DISCHARGE INSTRUCTIONS
Lumbar Epidural Steroid Injection  Patient Discharge Instructions  When [address] should not drive the day of the procedure. You may experience leg weakness during the first 24 hours following the procedure. To prevent yourself from falling, it is important to have someone help you walk. However, you do not need to stay in bed when you get home. In fact, it is best to walk around if you feel up to it, but you will need assistance during the first 24 hours following the epidural steroid injection. Even if you feel better right away, avoid activities that may strain your back. Keep in mind that some patients may feel increased pain for the first 24 hours. You should start feeling some pain relief 2-3 days following the injection. This is because the steroid will start working within three days of the injection with maximal effect by one week. At that time, we will evaluate your pain level to determine the need for another steroid injection. Remove bandaid(s) within 24 hours. When to Call Your Doctor  Call right away if you notice any of the following symptoms:  Severe pain or headache;  Fever or chills; Redness or swelling around the injection site. Loss of bladder or bowel control. You may contact [address] [place], [place] [address]. for any questions or problems that may occur at [phone] during the hours of 9am-4pm Monday-Friday, or the hospital  after hours at [phone], to have the interventional radiologist on call paged.   The [facility]  Cardiovascular Special Procedures  General Discharge Instructions  PROCEDURE: __Steroid Injection   ___x_ We strongly suggest that a responsible adult be with you for the next 24 hours for your protection and safety  DIETARY INSTRUCTIONS:    __xx__ Resume your previous diet  ACTIVITY INSTRUCTIONS:  ___xxx_ Up as tolerated  ___xxx_ Increase activity as tolerated  Wound/Dressing Instructions:  ___xxx_ May shower, tomorrow  ___xxx_ Remove bandage within 24 hours  MEDICATION INSTRUCTIONS:  __xxx__ continue to take your medications as directed by your physician   SPECIAL INSTRUCTIONS:                                                                                                                                                                                                                                                                                         Please make sure that you follow-up with your doctors office for your results. Call: Your ordering physician as needed or directed   FOLLOW-UP APPOINTMENT  Follow up with MD as needed or directed   Belongings returned to patient and/or family: Yes. The Discharge Instructions have been explained to me. I understand and can verbalize these instructions.

## 2022-12-22 ENCOUNTER — HOSPITAL ENCOUNTER (OUTPATIENT)
Dept: PHYSICAL THERAPY | Age: 72
Setting detail: THERAPIES SERIES
Discharge: HOME | End: 2022-12-22
Payer: MEDICARE

## 2022-12-22 PROCEDURE — 97140 MANUAL THERAPY 1/> REGIONS: CPT

## 2022-12-22 PROCEDURE — 97110 THERAPEUTIC EXERCISES: CPT

## 2022-12-22 PROCEDURE — 97112 NEUROMUSCULAR REEDUCATION: CPT

## 2022-12-22 NOTE — FLOWSHEET NOTE
oLretta Quinn 85, [address] [employer] [address] [place] [address], [address] [place] [address]  Phone: [phone]   Fax:     [phone]    Physical Therapy Daily Treatment Note  Date:  2022    Patient Name:  Dl Park  \"Flaviop\"  :  1950  MRN: [id number]  Restrictions/Precautions:    Medical/Treatment Diagnosis Information:  Diagnosis: Z98.1 (ICD-10-CM) - History of lumbar fusion  M48.062 (ICD-10-CM) - Lumbar stenosis with neurogenic claudication  M54.42, G89.29 (ICD-10-CM) - Chronic bilateral low back pain with left-sided sciatica  Treatment Diagnosis: M54.50 Chronic low back pain  Insurance/Certification information:  PT Insurance Information: Medicare  Physician Information:   Medicare  Has the plan of care been signed (Y/N):        []  Yes  [x]  No     Date of Patient follow up with Physician:       Is this a Progress Report:     []  Yes  [x]  No        If Yes:  Date Range for reporting period:  Beginning   Ending    Progress report will be due (10 Rx or 30 days whichever is less):        Recertification will be due (POC Duration  / 90 days whichever is less):          Visit # Insurance Allowable Auth Required   4   Medicare []  Yes []  No        Functional Scale: FOTO Lumbar 40  Date assessed:       Latex Allergy:  [x]NO      []YES  Preferred Language for Healthcare:   [x]English       []other:      Pain level:  2-3/10      SUBJECTIVE:   Pt states he received epidural injection yesterday and symptoms have not changed yet. Pt denies any issues after last visit. Having some tightness at bilateral piriformis muscles.      OBJECTIVE: See eval  Observation:   Test measurements:     Palpation Mild tension at bilateral paraspinals and piriformis muscles     RESTRICTIONS/PRECAUTIONS: Lumbar fusion L4-L5     Exercises/Interventions:   ROM/stretches     DKTC to SB roll ins 10\"hx10  Start     LTR X30 bilat ^12/16 completed with LE resting on SB    SKTC Progressed with DKTC 12/16   Hamstring stretch 30\"hx3 bilat 12/14 supine with strap   Piriformis stretch 30\"hx3 bilat 12/14 supine knee across chest  12/20 mild cues for proper direction of stretch    Lumbar rotation 10\"hx10 bilat Start 12/16 side lying   Prone on elbows 3x10  ^12/20             Strengthening                                   Neuro re-education      Glut sets 10\"hx10 ^12/16 prone   TA iso with overhead flexion 3x10 ^12/22 pt supine    TA marching 3x10 alt LE ^12/22   Bridges 3x10 DL 12/22 withheld holds and focused on multiple reps to improve glut activation and avoid compensatory movements   BKFO Blue TB 5\"hx10 bilat Start 12/20 cues to maintain neutral lumbar spine        Manual Intervention      Hip PROM with STM Hip IR/ER with STM to piriformis bilaterally x4' 12/22   STM Bilateral lumbar paraspinals x6' 12/22                                        Therapeutic Exercise and NMR EXR  [x] (39716) Provided verbal/tactile cueing for activities related to strengthening, flexibility, endurance, ROM  for improvements in proximal hip and core control with self care, mobility, lifting and ambulation.  [] (19174) Provided verbal/tactile cueing for activities related to improving balance, coordination, kinesthetic sense, posture, motor skill, proprioception  to assist with core control in self care, mobility, lifting, and ambulation.      Therapeutic Activities:    [x] (81179 or 39657) Provided verbal/tactile cueing for activities related to improving balance, coordination, kinesthetic sense, posture, motor skill, proprioception and motor activation to allow for proper function  with self care and ADLs  [] (78980) Provided training and instruction to the patient for proper core and proximal hip recruitment and positioning with ambulation re-education     Home Exercise Program:    [x] (31117) Reviewed/Progressed HEP activities related to strengthening, flexibility, endurance, ROM of core, proximal hip and LE for functional self-care, mobility, lifting and ambulation   [] (86049) Reviewed/Progressed HEP activities related to improving balance, coordination, kinesthetic sense, posture, motor skill, proprioception of core, proximal hip and LE for self care, mobility, lifting, and ambulation      Manual Treatments:  PROM / STM / Oscillations-Mobs:  G-I, II, III, IV (PA's, Inf., Post.)  [] (56208) Provided manual therapy to mobilize proximal hip and LS spine soft tissue/joints for the purpose of modulating pain, promoting relaxation,  increasing ROM, reducing/eliminating soft tissue swelling/inflammation/restriction, improving soft tissue extensibility and allowing for proper ROM for normal function with self care, mobility, lifting and ambulation. Modalities:  CP low back/ gluteals pt long sitting x10' 12/22    Charges:  Timed Code Treatment Minutes: 43'   Total Treatment Minutes: 2:45-3:40  55'       [] EVAL (LOW) 37376 (typically 20 minutes face-to-face)  [] EVAL (MOD) 55087 (typically 30 minutes face-to-face)  [] EVAL (HIGH) 14292 (typically 45 minutes face-to-face)  [] RE-EVAL     [x] YZ(86011) x 1    [] IONTO  [x] NMR (84992) x  1   [] VASO  [x] Manual (51657) x  1    [] Other:  [] TA x      [] Mech Traction (83339)  [] ES(attended) (10129)      [] ES (un) (89965):     Goals:   Patient stated goal: Move and complete daily activities without pain     [] Progressing: [] Met: [] Not Met: [] Adjusted     Therapist goals for Patient:   Short Term Goals: To be achieved in: 2 weeks  1. Independent in HEP and progression per patient tolerance, in order to prevent re-injury. [] Progressing: [] Met: [] Not Met: [] Adjusted   2. Patient will have a decrease in pain to facilitate improvement in movement, function, and ADLs as indicated by Functional Deficits. [] Progressing: [] Met: [] Not Met: [] Adjusted     Long Term Goals: To be achieved in: 4 weeks  1. Patient will score 55 or higher FOTO lumbar spine indicating subject improvement in function   [] Progressing: [] Met: [] Not Met: [] Adjusted  2. Patient will demonstrate increased trunk AROM to 20 deg extension in order to return from bent over positions without increase in pain or limitations. [] Progressing: [] Met: [] Not Met: [] Adjusted  3. Patient will demonstrate an increase in hip strength to 4/5 bilaterally in order to go from sit to stand with proper sequencing and no increase in symptoms. [] Progressing: [] Met: [] Not Met: [] Adjusted  4. Patient will demonstrate trunk AROM that is 100% of normal and pain free with rotations and side bends in order to complete bed mobility without pain or limitations   [] Progressing: [] Met: [] Not Met: [] Adjusted  5. Patient will be able to ambulate for 30 minutes without pain or limitations d/t improvements in hip strength and LE flexibility   [] Progressing: [] Met: [] Not Met: [] Adjusted       Overall Progression Towards Functional goals/ Treatment Progress Update:  [] Patient is progressing as expected towards functional goals listed. [] Progression is slowed due to complexities/Impairments listed. [] Progression has been slowed due to co-morbidities. [x] Plan just implemented, too soon to assess goals progression <30days   [] Goals require adjustment due to lack of progress  [] Patient is not progressing as expected and requires additional follow up with physician  [] Other    Prognosis for POC: [x] Good [] Fair  [] Poor      Patient requires continued skilled intervention: [x] Yes  [] No    Treatment/Activity Tolerance:  [x] Patient able to complete treatment  [] Patient limited by fatigue  [] Patient limited by pain     [] Patient limited by other medical complications  [] Other: 12/22 Pt presented with mild tension bilateral piriformis muscles that responded well to STM and PROM.  Mild tightness at lumbar paraspinals as well that also responded well to manual therapy. Pt continues to demonstrate difficulty engaging gluteals independently and compensation noted at lumbar paraspinals. Eventually able to complete glut sets without activation of lumbar paraspinals with verbal and tactile cues. Pt also required cues with bridges that he responded well to and completed exercise without issues. Completed treatment with no increase in pain     Patient education:  12/20 Updated HEP   12/16 Updated HEP   12/14: Educated on objective findings and POC. Educate on proper muscle activation and sequencing with sit to stand to decrease stress placed on lumbar spine. HEP instruction:   Access Code: [email or web address]      Prognosis: [x] Good [] Fair  [] Poor    Patient Requires Follow-up: [x] Yes  [] No    PLAN: See eval  [x] Continue per plan of care [] Alter current plan (see comments)  [x] Plan of care initiated [] Hold pending MD visit [] Discharge    Electronically signed by: Chelita Pelletier, PT PT, DPT    *If patient does not return for further follow ups after this date. Please consider this as the patients discharge from physical therapy.

## 2022-12-28 ENCOUNTER — HOSPITAL ENCOUNTER (OUTPATIENT)
Dept: PHYSICAL THERAPY | Age: 72
Setting detail: THERAPIES SERIES
Discharge: HOME | End: 2022-12-28
Payer: MEDICARE

## 2022-12-28 PROCEDURE — 97110 THERAPEUTIC EXERCISES: CPT

## 2022-12-28 PROCEDURE — 97140 MANUAL THERAPY 1/> REGIONS: CPT

## 2022-12-28 PROCEDURE — 97112 NEUROMUSCULAR REEDUCATION: CPT

## 2022-12-28 NOTE — FLOWSHEET NOTE
The [address] [place] [address] 200, [address], [address] [place] [address]  Phone: [phone]   Fax:     [phone]    Physical Therapy Daily Treatment Note  Date:  2022    Patient Name:  Daniel Hartley  \"Stan\"  :  1950  MRN: [id number]  Restrictions/Precautions:    Medical/Treatment Diagnosis Information:  Diagnosis: Z98.1 (ICD-10-CM) - History of lumbar fusion  M48.062 (ICD-10-CM) - Lumbar stenosis with neurogenic claudication  M54.42, G89.29 (ICD-10-CM) - Chronic bilateral low back pain with left-sided sciatica  Treatment Diagnosis: M54.50 Chronic low back pain  Insurance/Certification information:  PT Insurance Information: Medicare  Physician Information:   Medicare  Has the plan of care been signed (Y/N):        []  Yes  [x]  No     Date of Patient follow up with Physician:       Is this a Progress Report:     []  Yes  [x]  No        If Yes:  Date Range for reporting period:  Beginning   Ending    Progress report will be due (10 Rx or 30 days whichever is less):        Recertification will be due (POC Duration  / 90 days whichever is less):          Visit # Insurance Allowable Auth Required   5   Medicare []  Yes []  No        Functional Scale: FOTO Lumbar 40  Date assessed:       Latex Allergy:  [x]NO      []YES  Preferred Language for Healthcare:   [x]English       []other:      Pain level:  1-2/10      SUBJECTIVE:  : Pt states pain is less and no issues after last visit.      OBJECTIVE: See eval  Observation:   Test measurements:     Palpation Mild tension at bilateral paraspinals and piriformis muscles     RESTRICTIONS/PRECAUTIONS: Lumbar fusion L4-L5     Exercises/Interventions:   ROM/stretches     DKTC to SB roll ins 10\"hx10  Start     LTR X30 bilat ^ completed with LE resting on SB    SKTC Progressed with DKTC    Hamstring stretch 30\"hx3 bilat  supine with strap   Piriformis stretch 30\"hx3 bilat 12/14 supine knee across chest  12/20 mild cues for proper direction of stretch    Lumbar rotation 20\"hx5 bilat ^12/28  side lying   Prone on elbows 3x10  ^12/20             Strengthening     Prone hip extension 3x10 alt LE Start 12/28 mild cues to limit hip ext ROM in order to avoid compensation at lumbar paraspinals                             Neuro re-education      Glut sets 10\"hx10 ^12/16 prone   TA iso with overhead flexion 2# med ball 3x10 ^12/28 pt supine    TA marching 3x10 alt LE ^12/22   Bridges 5\"hx10 DL ^12/28 reintroduced holds based on improved muscle activation   BKFO Blue TB 10\"hx10 bilat ^12/28       Manual Intervention      Hip PROM with STM Hip IR/ER with STM to piriformis bilaterally R>L x4' 12/28   STM Bilateral lumbar paraspinals L>R x6' 12/28                                         Therapeutic Exercise and NMR EXR  [x] (78645) Provided verbal/tactile cueing for activities related to strengthening, flexibility, endurance, ROM  for improvements in proximal hip and core control with self care, mobility, lifting and ambulation.  [] (18440) Provided verbal/tactile cueing for activities related to improving balance, coordination, kinesthetic sense, posture, motor skill, proprioception  to assist with core control in self care, mobility, lifting, and ambulation.      Therapeutic Activities:    [x] (74617 or 99650) Provided verbal/tactile cueing for activities related to improving balance, coordination, kinesthetic sense, posture, motor skill, proprioception and motor activation to allow for proper function  with self care and ADLs  [] (12605) Provided training and instruction to the patient for proper core and proximal hip recruitment and positioning with ambulation re-education     Home Exercise Program:    [x] (26711) Reviewed/Progressed HEP activities related to strengthening, flexibility, endurance, ROM of core, proximal hip and LE for functional self-care, mobility, lifting and ambulation   [] (97780) Reviewed/Progressed HEP activities related to improving balance, coordination, kinesthetic sense, posture, motor skill, proprioception of core, proximal hip and LE for self care, mobility, lifting, and ambulation      Manual Treatments:  PROM / STM / Oscillations-Mobs:  G-I, II, III, IV (PA's, Inf., Post.)  [] (99585) Provided manual therapy to mobilize proximal hip and LS spine soft tissue/joints for the purpose of modulating pain, promoting relaxation,  increasing ROM, reducing/eliminating soft tissue swelling/inflammation/restriction, improving soft tissue extensibility and allowing for proper ROM for normal function with self care, mobility, lifting and ambulation. Modalities:  CP low back/ gluteals pt long sitting x10' 12/28    Charges:  Timed Code Treatment Minutes: 39'   Total Treatment Minutes: 2:28-3:30  58'       [] EVAL (LOW) 27126 (typically 20 minutes face-to-face)  [] EVAL (MOD) 19512 (typically 30 minutes face-to-face)  [] EVAL (HIGH) 93133 (typically 45 minutes face-to-face)  [] RE-EVAL     [x] GW(68416) x 1    [] IONTO  [x] NMR (98381) x  1   [] VASO  [x] Manual (40955) x  1    [] Other:  [] TA x      [] Mech Traction (25729)  [] ES(attended) (96268)      [] ES (un) (31005):     Goals:   Patient stated goal: Move and complete daily activities without pain     [] Progressing: [] Met: [] Not Met: [] Adjusted     Therapist goals for Patient:   Short Term Goals: To be achieved in: 2 weeks  1. Independent in HEP and progression per patient tolerance, in order to prevent re-injury. [] Progressing: [] Met: [] Not Met: [] Adjusted   2. Patient will have a decrease in pain to facilitate improvement in movement, function, and ADLs as indicated by Functional Deficits. [] Progressing: [] Met: [] Not Met: [] Adjusted     Long Term Goals: To be achieved in: 4 weeks  1.  Patient will score 55 or higher FOTO lumbar spine indicating subject improvement in function   [] Progressing: [] Met: [] Not Met: [] Adjusted  2. Patient will demonstrate increased trunk AROM to 20 deg extension in order to return from bent over positions without increase in pain or limitations. [] Progressing: [] Met: [] Not Met: [] Adjusted  3. Patient will demonstrate an increase in hip strength to 4/5 bilaterally in order to go from sit to stand with proper sequencing and no increase in symptoms. [] Progressing: [] Met: [] Not Met: [] Adjusted  4. Patient will demonstrate trunk AROM that is 100% of normal and pain free with rotations and side bends in order to complete bed mobility without pain or limitations   [] Progressing: [] Met: [] Not Met: [] Adjusted  5. Patient will be able to ambulate for 30 minutes without pain or limitations d/t improvements in hip strength and LE flexibility   [] Progressing: [] Met: [] Not Met: [] Adjusted       Overall Progression Towards Functional goals/ Treatment Progress Update:  [] Patient is progressing as expected towards functional goals listed. [] Progression is slowed due to complexities/Impairments listed. [] Progression has been slowed due to co-morbidities. [x] Plan just implemented, too soon to assess goals progression <30days   [] Goals require adjustment due to lack of progress  [] Patient is not progressing as expected and requires additional follow up with physician  [] Other    Prognosis for POC: [x] Good [] Fair  [] Poor      Patient requires continued skilled intervention: [x] Yes  [] No    Treatment/Activity Tolerance:  [x] Patient able to complete treatment  [] Patient limited by fatigue  [] Patient limited by pain     [] Patient limited by other medical complications  [] Other: 12/28 Pt is continuing to respond well to treatment with decrease in severity of symptoms. He presented with tightness L lumbar paraspinals compared to R and R piriformis compared to L which responded well to manual therapy.  Pt demonstrated improved glut activation and able to introduce prone hip extension without increase in symptoms. Tolerated treatment with no adverse effects    Patient education:  12/28 Updated HEP   12/20 Updated HEP   12/16 Updated HEP   12/14: Educated on objective findings and POC. Educate on proper muscle activation and sequencing with sit to stand to decrease stress placed on lumbar spine. HEP instruction:   Access Code: [email or web address]      Prognosis: [x] Good [] Fair  [] Poor    Patient Requires Follow-up: [x] Yes  [] No    PLAN: See eval  [x] Continue per plan of care [] Alter current plan (see comments)  [x] Plan of care initiated [] Hold pending MD visit [] Discharge    Electronically signed by: Alejandro Gimenez, PT PT, DPT    *If patient does not return for further follow ups after this date. Please consider this as the patients discharge from physical therapy.

## 2022-12-30 ENCOUNTER — HOSPITAL ENCOUNTER (OUTPATIENT)
Dept: PHYSICAL THERAPY | Age: 72
Setting detail: THERAPIES SERIES
Discharge: HOME | End: 2022-12-30
Payer: MEDICARE

## 2022-12-30 PROCEDURE — 97140 MANUAL THERAPY 1/> REGIONS: CPT

## 2022-12-30 PROCEDURE — 97112 NEUROMUSCULAR REEDUCATION: CPT

## 2022-12-30 PROCEDURE — 97110 THERAPEUTIC EXERCISES: CPT

## 2022-12-30 NOTE — FLOWSHEET NOTE
Loretta Quinn 85, [address] [place] [address], [address] [place] [address]  Phone: [phone]   Fax:     [phone]    Physical Therapy Daily Treatment Note  Date:  2022    Patient Name:  Mendel Patiño  \"Stan\"  :  1950  MRN: [id number]  Restrictions/Precautions:    Medical/Treatment Diagnosis Information:  Diagnosis: Z98.1 (ICD-10-CM) - History of lumbar fusion  M48.062 (ICD-10-CM) - Lumbar stenosis with neurogenic claudication  M54.42, G89.29 (ICD-10-CM) - Chronic bilateral low back pain with left-sided sciatica  Treatment Diagnosis: M54.50 Chronic low back pain  Insurance/Certification information:  PT Insurance Information: Medicare  Physician Information:   Medicare  Has the plan of care been signed (Y/N):        [x]  Yes  []  No     Date of Patient follow up with Physician:       Is this a Progress Report:     []  Yes  [x]  No        If Yes:  Date Range for reporting period:  Beginning   Ending    Progress report will be due (10 Rx or 30 days whichever is less):        Recertification will be due (POC Duration  / 90 days whichever is less):          Visit # Insurance Allowable Auth Required   6   Medicare []  Yes []  No        Functional Scale: FOTO Lumbar 40  Date assessed:       Latex Allergy:  [x]NO      []YES  Preferred Language for Healthcare:   [x]English       []other:      Pain level:  2/10 12/30     SUBJECTIVE:   Pt reports no issues after last visit and pain in back remains about the same. Started experiencing pain at top of L foot yesterday with no known cause. Symptoms improve with wearing shows.  Denies radiating symptoms down LE or numbness/tingling    OBJECTIVE: See eval  Observation:   Test measurements:     Palpation Mild tension at bilateral paraspinals and piriformis muscles     RESTRICTIONS/PRECAUTIONS: Lumbar fusion L4-L5     Exercises/Interventions:   ROM/stretches DKTC to SB roll ins 10\"hx10  Start 12/16    LTR X30 bilat ^12/16 completed with LE resting on SB    SKTC Progressed with DKTC 12/16   Hamstring stretch 30\"hx3 bilat 12/14 supine with strap   Piriformis stretch 30\"hx3 bilat 12/14 supine knee across chest  12/20 mild cues for proper direction of stretch    Lumbar rotation 30\"hx3 bilat ^12/30  side lying   Prone on elbows 3x10  ^12/20             Strengthening     Prone hip extension 3x10 alt LE Start 12/28 mild cues to limit hip ext ROM in order to avoid compensation at lumbar paraspinals    Clams Blue TB 3x10 bilat Start 12/30 side lying                        Neuro re-education      Glut sets 10\"hx10 ^12/16 prone   TA iso with overhead flexion 2# med ball 3x10 ^12/28 pt supine    TA marching 3x10 alt LE ^12/22   Bridges 5\"h 2x10 DL ^12/30    BKFO Progressed with clams 12/30       Manual Intervention      Hip PROM with STM Hip IR/ER with STM to piriformis bilaterally R>L x4' 12/28   STM Bilateral lumbar paraspinals L>R x6' 12/28                                         Therapeutic Exercise and NMR EXR  [x] (81058) Provided verbal/tactile cueing for activities related to strengthening, flexibility, endurance, ROM  for improvements in proximal hip and core control with self care, mobility, lifting and ambulation.  [] (29762) Provided verbal/tactile cueing for activities related to improving balance, coordination, kinesthetic sense, posture, motor skill, proprioception  to assist with core control in self care, mobility, lifting, and ambulation.      Therapeutic Activities:    [x] (36779 or 81034) Provided verbal/tactile cueing for activities related to improving balance, coordination, kinesthetic sense, posture, motor skill, proprioception and motor activation to allow for proper function  with self care and ADLs  [] (23888) Provided training and instruction to the patient for proper core and proximal hip recruitment and positioning with ambulation re-education Home Exercise Program:    [x] (15683) Reviewed/Progressed HEP activities related to strengthening, flexibility, endurance, ROM of core, proximal hip and LE for functional self-care, mobility, lifting and ambulation   [] (01159) Reviewed/Progressed HEP activities related to improving balance, coordination, kinesthetic sense, posture, motor skill, proprioception of core, proximal hip and LE for self care, mobility, lifting, and ambulation      Manual Treatments:  PROM / STM / Oscillations-Mobs:  G-I, II, III, IV (PA's, Inf., Post.)  [] (32389) Provided manual therapy to mobilize proximal hip and LS spine soft tissue/joints for the purpose of modulating pain, promoting relaxation,  increasing ROM, reducing/eliminating soft tissue swelling/inflammation/restriction, improving soft tissue extensibility and allowing for proper ROM for normal function with self care, mobility, lifting and ambulation. Modalities:  CP low back/ gluteals pt long sitting x10' 12/30    Charges:  Timed Code Treatment Minutes: 40'   Total Treatment Minutes: 2:32-3:25  48'       [] EVAL (LOW) 99917 (typically 20 minutes face-to-face)  [] EVAL (MOD) 69536 (typically 30 minutes face-to-face)  [] EVAL (HIGH) 93505 (typically 45 minutes face-to-face)  [] RE-EVAL     [x] QS(27876) x 1    [] IONTO  [x] NMR (54972) x  1   [] VASO  [x] Manual (77596) x  1    [] Other:  [] TA x      [] Mech Traction (42298)  [] ES(attended) (24051)      [] ES (un) (97482):     Goals:   Patient stated goal: Move and complete daily activities without pain     [] Progressing: [] Met: [] Not Met: [] Adjusted     Therapist goals for Patient:   Short Term Goals: To be achieved in: 2 weeks  1. Independent in HEP and progression per patient tolerance, in order to prevent re-injury. [] Progressing: [] Met: [] Not Met: [] Adjusted   2. Patient will have a decrease in pain to facilitate improvement in movement, function, and ADLs as indicated by Functional Deficits.     [] Progressing: [] Met: [] Not Met: [] Adjusted     Long Term Goals: To be achieved in: 4 weeks  1. Patient will score 55 or higher FOTO lumbar spine indicating subject improvement in function   [] Progressing: [] Met: [] Not Met: [] Adjusted  2. Patient will demonstrate increased trunk AROM to 20 deg extension in order to return from bent over positions without increase in pain or limitations. [] Progressing: [] Met: [] Not Met: [] Adjusted  3. Patient will demonstrate an increase in hip strength to 4/5 bilaterally in order to go from sit to stand with proper sequencing and no increase in symptoms. [] Progressing: [] Met: [] Not Met: [] Adjusted  4. Patient will demonstrate trunk AROM that is 100% of normal and pain free with rotations and side bends in order to complete bed mobility without pain or limitations   [] Progressing: [] Met: [] Not Met: [] Adjusted  5. Patient will be able to ambulate for 30 minutes without pain or limitations d/t improvements in hip strength and LE flexibility   [] Progressing: [] Met: [] Not Met: [] Adjusted       Overall Progression Towards Functional goals/ Treatment Progress Update:  [] Patient is progressing as expected towards functional goals listed. [] Progression is slowed due to complexities/Impairments listed. [] Progression has been slowed due to co-morbidities.   [x] Plan just implemented, too soon to assess goals progression <30days   [] Goals require adjustment due to lack of progress  [] Patient is not progressing as expected and requires additional follow up with physician  [] Other    Prognosis for POC: [x] Good [] Fair  [] Poor      Patient requires continued skilled intervention: [x] Yes  [] No    Treatment/Activity Tolerance:  [x] Patient able to complete treatment  [] Patient limited by fatigue  [] Patient limited by pain     [] Patient limited by other medical complications  [] Other: 12/30 Pt still has mild muscle tension and tenderness at paraspinals and piriformis that responded well to manual therapy. Improved glut activation noted with pt completing glut sets in prone. Less cues required during treatment in order to avoid compensatory movements. Tolerated progression of exercises with no adverse effects. Patient education:  12/28 Updated HEP   12/20 Updated HEP   12/16 Updated HEP   12/14: Educated on objective findings and POC. Educate on proper muscle activation and sequencing with sit to stand to decrease stress placed on lumbar spine. HEP instruction:   Access Code: [email or web address]      Prognosis: [x] Good [] Fair  [] Poor    Patient Requires Follow-up: [x] Yes  [] No    PLAN: See eval  [x] Continue per plan of care [] Alter current plan (see comments)  [x] Plan of care initiated [] Hold pending MD visit [] Discharge    Electronically signed by: Amanda Lowe, PT PT, DPT    *If patient does not return for further follow ups after this date. Please consider this as the patients discharge from physical therapy.

## 2023-01-03 ENCOUNTER — HOSPITAL ENCOUNTER (OUTPATIENT)
Dept: PHYSICAL THERAPY | Age: 73
Setting detail: THERAPIES SERIES
Discharge: HOME | End: 2023-01-03
Payer: MEDICARE

## 2023-01-03 PROCEDURE — 97110 THERAPEUTIC EXERCISES: CPT

## 2023-01-03 PROCEDURE — 97112 NEUROMUSCULAR REEDUCATION: CPT

## 2023-01-03 NOTE — FLOWSHEET NOTE
[facility] [address]  Phone: [phone]   Fax:     [phone]    Physical Therapy Daily Treatment Note  Date:  1/3/2023    Patient Name:  Bro Michelle  \"Stan\"  :  1950  MRN: [id number]  Restrictions/Precautions:    Medical/Treatment Diagnosis Information:  Diagnosis: Z98.1 (ICD-10-CM) - History of lumbar fusion  M48.062 (ICD-10-CM) - Lumbar stenosis with neurogenic claudication  M54.42, G89.29 (ICD-10-CM) - Chronic bilateral low back pain with left-sided sciatica  Treatment Diagnosis: M54.50 Chronic low back pain  Insurance/Certification information:  PT Insurance Information: Medicare  Physician Information:   Medicare  Has the plan of care been signed (Y/N):        [x]  Yes  []  No     Date of Patient follow up with Physician:       Is this a Progress Report:     []  Yes  [x]  No        If Yes:  Date Range for reporting period:  Beginning   Ending    Progress report will be due (10 Rx or 30 days whichever is less):        Recertification will be due (POC Duration  / 90 days whichever is less):          Visit # Insurance Allowable Auth Required   7  1/3 Medicare []  Yes []  No        Functional Scale: FOTO Lumbar 40  Date assessed:       Latex Allergy:  [x]NO      []YES  Preferred Language for Healthcare:   [x]English       []other:      Pain level:  1/10 1/3     SUBJECTIVE:  1/3 Pt reports he is continuing to do well and decrease pain compared to last week.      OBJECTIVE: See eval  Observation:   Test measurements:     Palpation Mild tension at bilateral paraspinals and piriformis muscles     RESTRICTIONS/PRECAUTIONS: Lumbar fusion L4-L5     Exercises/Interventions:   ROM/stretches     DKTC to SB roll ins 10\"hx10  Start     LTR X30 bilat ^ completed with LE resting on SB    SKTC Progressed with DKTC    Hamstring stretch 30\"hx3 bilat 12/14 supine with strap   Piriformis stretch 30\"hx3 bilat 12/14 supine knee across chest  12/20 mild cues for proper direction of stretch    Lumbar rotation 30\"hx3 bilat ^12/30  side lying   Prone on elbows ^12/20             Strengthening     Prone hip extension Red TB 2x10 alt LE ^1/3    Clams Black TB 3x10 bilat ^1/3 side lying                        Neuro re-education      Glut sets D/c d/t progress 1/3   TA iso with overhead flexion Progressed with dead bug 1/3   TA marching Progressed with dead bug 1/3   Dead bug 3x10 alt UE/LE Start 1/3   Bridges 10\"h 1x10 DL ^1/3   BKFO Progressed with clams 12/30   SLS with hip abd 3x10 bilat 1/3        Manual Intervention      Hip PROM with STM Withheld d/t decrease muscle tension in gluteal region 1/3   STM Assessed but withheld d/t decrease muscle tension and noted at lumbar paraspinals 1/3                                        Therapeutic Exercise and NMR EXR  [x] (28160) Provided verbal/tactile cueing for activities related to strengthening, flexibility, endurance, ROM  for improvements in proximal hip and core control with self care, mobility, lifting and ambulation.  [] (42435) Provided verbal/tactile cueing for activities related to improving balance, coordination, kinesthetic sense, posture, motor skill, proprioception  to assist with core control in self care, mobility, lifting, and ambulation.      Therapeutic Activities:    [x] (25372 or 51835) Provided verbal/tactile cueing for activities related to improving balance, coordination, kinesthetic sense, posture, motor skill, proprioception and motor activation to allow for proper function  with self care and ADLs  [] (28567) Provided training and instruction to the patient for proper core and proximal hip recruitment and positioning with ambulation re-education     Home Exercise Program:    [x] (40307) Reviewed/Progressed HEP activities related to strengthening, flexibility, endurance, ROM of core, proximal hip and LE for functional self-care, mobility, lifting and ambulation   [] (92556) Reviewed/Progressed HEP activities related to improving balance, coordination, kinesthetic sense, posture, motor skill, proprioception of core, proximal hip and LE for self care, mobility, lifting, and ambulation      Manual Treatments:  PROM / STM / Oscillations-Mobs:  G-I, II, III, IV (PA's, Inf., Post.)  [] (14237) Provided manual therapy to mobilize proximal hip and LS spine soft tissue/joints for the purpose of modulating pain, promoting relaxation,  increasing ROM, reducing/eliminating soft tissue swelling/inflammation/restriction, improving soft tissue extensibility and allowing for proper ROM for normal function with self care, mobility, lifting and ambulation. Modalities:  CP low back/ gluteals pt long sitting x10' 1/3    Charges:  Timed Code Treatment Minutes: 40'   Total Treatment Minutes: 2:35-4:25  50'       [] EVAL (LOW) 40253 (typically 20 minutes face-to-face)  [] EVAL (MOD) 83751 (typically 30 minutes face-to-face)  [] EVAL (HIGH) 45370 (typically 45 minutes face-to-face)  [] RE-EVAL     [x] BF(90407) x 1    [] IONTO  [x] NMR (22970) x  2   [] VASO  [] Manual (34410) x      [] Other:  [] TA x      [] Mech Traction (71363)  [] ES(attended) (78402)      [] ES (un) (28991):     Goals:   Patient stated goal: Move and complete daily activities without pain     [] Progressing: [] Met: [] Not Met: [] Adjusted     Therapist goals for Patient:   Short Term Goals: To be achieved in: 2 weeks  1. Independent in HEP and progression per patient tolerance, in order to prevent re-injury. [] Progressing: [] Met: [] Not Met: [] Adjusted   2. Patient will have a decrease in pain to facilitate improvement in movement, function, and ADLs as indicated by Functional Deficits. [] Progressing: [] Met: [] Not Met: [] Adjusted     Long Term Goals: To be achieved in: 4 weeks  1.  Patient will score 55 or higher FOTO lumbar spine indicating subject improvement in function   [] Progressing: [] Met: [] Not Met: [] Adjusted  2. Patient will demonstrate increased trunk AROM to 20 deg extension in order to return from bent over positions without increase in pain or limitations. [] Progressing: [] Met: [] Not Met: [] Adjusted  3. Patient will demonstrate an increase in hip strength to 4/5 bilaterally in order to go from sit to stand with proper sequencing and no increase in symptoms. [] Progressing: [] Met: [] Not Met: [] Adjusted  4. Patient will demonstrate trunk AROM that is 100% of normal and pain free with rotations and side bends in order to complete bed mobility without pain or limitations   [] Progressing: [] Met: [] Not Met: [] Adjusted  5. Patient will be able to ambulate for 30 minutes without pain or limitations d/t improvements in hip strength and LE flexibility   [] Progressing: [] Met: [] Not Met: [] Adjusted       Overall Progression Towards Functional goals/ Treatment Progress Update:  [] Patient is progressing as expected towards functional goals listed. [] Progression is slowed due to complexities/Impairments listed. [] Progression has been slowed due to co-morbidities. [x] Plan just implemented, too soon to assess goals progression <30days   [] Goals require adjustment due to lack of progress  [] Patient is not progressing as expected and requires additional follow up with physician  [] Other    Prognosis for POC: [x] Good [] Fair  [] Poor      Patient requires continued skilled intervention: [x] Yes  [] No    Treatment/Activity Tolerance:  [x] Patient able to complete treatment  [] Patient limited by fatigue  [] Patient limited by pain     [] Patient limited by other medical complications  [] Other: 1/3 Pt presented with decrease in muscle tension and tenderness at lumbar paraspinals and gluteals. Based on progress withheld manual therapy.  Pt required increase time spent educating on proper muscle activation and core stabilization as well as slowly down exercises to avoid compensatory movement. Pt tolerated treatment well but developed soreness in low back with SLS hip abd d/t improper form. Symptoms improved with providing cues for proper form and to slow down. Patient education:  1/3 Updated HEP   12/28 Updated HEP   12/20 Updated HEP   12/16 Updated HEP   12/14: Educated on objective findings and POC. Educate on proper muscle activation and sequencing with sit to stand to decrease stress placed on lumbar spine. HEP instruction:   Access Code: [email or web address]      Prognosis: [x] Good [] Fair  [] Poor    Patient Requires Follow-up: [x] Yes  [] No    PLAN: See eval  [x] Continue per plan of care [] Alter current plan (see comments)  [x] Plan of care initiated [] Hold pending MD visit [] Discharge  1/3 Monitor symptoms and progress hip strengthening and core stabilization in weight bearing as tolerated. Electronically signed by: Kamilah Ramos, PT PT, DPT    *If patient does not return for further follow ups after this date. Please consider this as the patients discharge from physical therapy.

## 2023-01-06 ENCOUNTER — HOSPITAL ENCOUNTER (OUTPATIENT)
Dept: PHYSICAL THERAPY | Age: 73
Setting detail: THERAPIES SERIES
Discharge: HOME | End: 2023-01-06
Payer: MEDICARE

## 2023-01-06 PROCEDURE — 97110 THERAPEUTIC EXERCISES: CPT

## 2023-01-06 PROCEDURE — 97140 MANUAL THERAPY 1/> REGIONS: CPT

## 2023-01-06 PROCEDURE — 97112 NEUROMUSCULAR REEDUCATION: CPT

## 2023-01-06 NOTE — FLOWSHEET NOTE
[address], [address] [place] [address]  Phone: [phone]   Fax:     [phone]    Physical Therapy Daily Treatment Note  Date:  2023    Patient Name:  Devonte Jamison  \"Stan\"  :  1950  MRN: [id number]  Restrictions/Precautions:    Medical/Treatment Diagnosis Information:  Diagnosis: Z98.1 (ICD-10-CM) - History of lumbar fusion  M48.062 (ICD-10-CM) - Lumbar stenosis with neurogenic claudication  M54.42, G89.29 (ICD-10-CM) - Chronic bilateral low back pain with left-sided sciatica  Treatment Diagnosis: M54.50 Chronic low back pain  Insurance/Certification information:  PT Insurance Information: Medicare  Physician Information:   Medicare  Has the plan of care been signed (Y/N):        [x]  Yes  []  No     Date of Patient follow up with Physician:       Is this a Progress Report:     []  Yes  [x]  No        If Yes:  Date Range for reporting period:  Beginning   Ending    Progress report will be due (10 Rx or 30 days whichever is less):        Recertification will be due (POC Duration  / 90 days whichever is less):          Visit # Insurance Allowable Auth Required   8   Medicare []  Yes []  No        Functional Scale: FOTO Lumbar 40  Date assessed:       Latex Allergy:  [x]NO      []YES  Preferred Language for Healthcare:   [x]English       []other:      Pain level:  0/10      SUBJECTIVE:   Pt states he is doing well overall with no issues to report upon since last visit and denied having any pain at start of treatment.      OBJECTIVE: See eval  Observation:   Test measurements:     Palpation Mild tension at bilateral paraspinals and piriformis muscles     RESTRICTIONS/PRECAUTIONS: Lumbar fusion L4-L5     Exercises/Interventions:   ROM/stretches     DKTC to SB roll ins 10\"hx10  Start     LTR X30 bilat ^ completed with LE resting on SB    SKTC Progressed with [facility] 12/16   Hamstring stretch 30\"hx3 bilat 12/14 supine with strap  1/6 cues to keep opposite knee bent   Piriformis stretch 30\"hx3 bilat 12/14 supine knee across chest  12/20 mild cues for proper direction of stretch    Lumbar rotation 30\"hx3 bilat ^12/30  side lying   Prone on elbows ^12/20             Strengthening     Prone hip extension Red TB 3x10 alt LE ^1/36   Clams Black TB 3x10 bilat ^1/3 side lying                        Neuro re-education      Glut sets D/c d/t progress 1/3   TA iso with overhead flexion Progressed with dead bug 1/3   TA marching Progressed with dead bug 1/3   Dead bug 3x10 alt UE/LE Start 1/3   Bridges 10\"h 1x10 DL ^1/3   BKFO Progressed with clams 12/30   SLS with hip abd 3x10 bilat 1/3        Manual Intervention      Hip PROM with STM R hip ER/IR with STM R piriformis x4' 1/6 reintroduced d/t mild restrictions noted with hip ROM on R and tightness in muscles   STM R lumbar paraspinals x4' 1/6 reintroduced d/t mild increase in tightness noted compared to last pt visit. Therapeutic Exercise and NMR EXR  [x] (03038) Provided verbal/tactile cueing for activities related to strengthening, flexibility, endurance, ROM  for improvements in proximal hip and core control with self care, mobility, lifting and ambulation.  [] (19267) Provided verbal/tactile cueing for activities related to improving balance, coordination, kinesthetic sense, posture, motor skill, proprioception  to assist with core control in self care, mobility, lifting, and ambulation.      Therapeutic Activities:    [x] (36637 or 77063) Provided verbal/tactile cueing for activities related to improving balance, coordination, kinesthetic sense, posture, motor skill, proprioception and motor activation to allow for proper function  with self care and ADLs  [] (99080) Provided training and instruction to the patient for proper core and proximal hip recruitment and positioning with ambulation re-education     Home Exercise Program:    [x] (58268) Reviewed/Progressed HEP activities related to strengthening, flexibility, endurance, ROM of core, proximal hip and LE for functional self-care, mobility, lifting and ambulation   [] (65651) Reviewed/Progressed HEP activities related to improving balance, coordination, kinesthetic sense, posture, motor skill, proprioception of core, proximal hip and LE for self care, mobility, lifting, and ambulation      Manual Treatments:  PROM / STM / Oscillations-Mobs:  G-I, II, III, IV (PA's, Inf., Post.)  [] (48076) Provided manual therapy to mobilize proximal hip and LS spine soft tissue/joints for the purpose of modulating pain, promoting relaxation,  increasing ROM, reducing/eliminating soft tissue swelling/inflammation/restriction, improving soft tissue extensibility and allowing for proper ROM for normal function with self care, mobility, lifting and ambulation. Modalities:  CP low back/ gluteals pt long sitting x10' 1/6    Charges:  Timed Code Treatment Minutes: 40'   Total Treatment Minutes: 3:06-3:59  48'       [] EVAL (LOW) 44803 (typically 20 minutes face-to-face)  [] EVAL (MOD) 48390 (typically 30 minutes face-to-face)  [] EVAL (HIGH) 78596 (typically 45 minutes face-to-face)  [] RE-EVAL     [x] WR(18291) x 1    [] IONTO  [x] NMR (00050) x  1   [] VASO  [x] Manual (69047) x 1      [] Other:  [] TA x      [] Mech Traction (11440)  [] ES(attended) (92393)      [] ES (un) (37040):     Goals:   Patient stated goal: Move and complete daily activities without pain     [] Progressing: [] Met: [] Not Met: [] Adjusted     Therapist goals for Patient:   Short Term Goals: To be achieved in: 2 weeks  1. Independent in HEP and progression per patient tolerance, in order to prevent re-injury. [] Progressing: [] Met: [] Not Met: [] Adjusted   2.  Patient will have a decrease in pain to facilitate improvement in movement, function, and ADLs as indicated by Functional Deficits. [] Progressing: [] Met: [] Not Met: [] Adjusted     Long Term Goals: To be achieved in: 4 weeks  1. Patient will score 55 or higher FOTO lumbar spine indicating subject improvement in function   [] Progressing: [] Met: [] Not Met: [] Adjusted  2. Patient will demonstrate increased trunk AROM to 20 deg extension in order to return from bent over positions without increase in pain or limitations. [] Progressing: [] Met: [] Not Met: [] Adjusted  3. Patient will demonstrate an increase in hip strength to 4/5 bilaterally in order to go from sit to stand with proper sequencing and no increase in symptoms. [] Progressing: [] Met: [] Not Met: [] Adjusted  4. Patient will demonstrate trunk AROM that is 100% of normal and pain free with rotations and side bends in order to complete bed mobility without pain or limitations   [] Progressing: [] Met: [] Not Met: [] Adjusted  5. Patient will be able to ambulate for 30 minutes without pain or limitations d/t improvements in hip strength and LE flexibility   [] Progressing: [] Met: [] Not Met: [] Adjusted       Overall Progression Towards Functional goals/ Treatment Progress Update:  [] Patient is progressing as expected towards functional goals listed. [] Progression is slowed due to complexities/Impairments listed. [] Progression has been slowed due to co-morbidities.   [x] Plan just implemented, too soon to assess goals progression <30days   [] Goals require adjustment due to lack of progress  [] Patient is not progressing as expected and requires additional follow up with physician  [] Other    Prognosis for POC: [x] Good [] Fair  [] Poor      Patient requires continued skilled intervention: [x] Yes  [] No    Treatment/Activity Tolerance:  [x] Patient able to complete treatment  [] Patient limited by fatigue  [] Patient limited by pain     [] Patient limited by other medical complications  [] Other: 1/6 Cramping R low back reported when stretching L hamstring d/t having R LE flat on table. Symptoms improved with bending R knee during stretch. Pt continues to require mild cues for proper hand placement and form with piriformis and lumbar rotation stretches. Pt presented with mild increase tightness R lumbar paraspinals and R piriformis compared to last pt visit. D/t presentation of symptoms reintroduced manual therapy which he responded well to. Cues continued to be required during treatment to slow down and focus on control as well as proper form. Soreness/ pain R proximal glut at end of treatment that responded well to CP. Patient education:  1/3 Updated HEP   12/28 Updated HEP   12/20 Updated HEP   12/16 Updated HEP   12/14: Educated on objective findings and POC. Educate on proper muscle activation and sequencing with sit to stand to decrease stress placed on lumbar spine. HEP instruction:   Access Code: [email or web address]      Prognosis: [x] Good [] Fair  [] Poor    Patient Requires Follow-up: [x] Yes  [] No    PLAN: See eval  [x] Continue per plan of care [] Alter current plan (see comments)  [x] Plan of care initiated [] Hold pending MD visit [] Discharge  1/6 Complete progress note and update HEP NPV d/t pt leaving for [facility] for 2 months. Electronically signed by: Jeannie Ibrahim, PT PT, DPT    *If patient does not return for further follow ups after this date. Please consider this as the patients discharge from physical therapy.

## 2023-01-10 ENCOUNTER — HOSPITAL ENCOUNTER (OUTPATIENT)
Dept: PHYSICAL THERAPY | Age: 73
Setting detail: THERAPIES SERIES
Discharge: HOME | End: 2023-01-10
Payer: MEDICARE

## 2023-01-10 PROCEDURE — 97110 THERAPEUTIC EXERCISES: CPT

## 2023-01-10 PROCEDURE — 97112 NEUROMUSCULAR REEDUCATION: CPT

## 2023-01-10 NOTE — PROGRESS NOTES
The [address] [place] [address],[address],  [address]  Phone: [phone]   Fax:     [phone]    Physical Therapy Daily Treatment Note  Date:  1/10/2023    Patient Name:  August Nathan  \"Stan\"  :  1950  MRN: [id number]  Restrictions/Precautions:    Medical/Treatment Diagnosis Information:  Diagnosis: Z98.1 (ICD-10-CM) - History of lumbar fusion  M48.062 (ICD-10-CM) - Lumbar stenosis with neurogenic claudication  M54.42, G89.29 (ICD-10-CM) - Chronic bilateral low back pain with left-sided sciatica  Treatment Diagnosis: M54.50 Chronic low back pain  Insurance/Certification information:  PT Insurance Information: Medicare  Physician Information:   Medicare  Has the plan of care been signed (Y/N):        [x]  Yes  []  No     Date of Patient follow up with Physician:       Is this a Progress Report:     [x]  Yes  []  No        If Yes:  Date Range for reporting period:  Beginning   Ending 1/10     Progress report will be due (10 Rx or 30 days whichever is less):        Recertification will be due (POC Duration  / 90 days whichever is less):          Visit # Insurance Allowable Auth Required   9  1/10 Medicare []  Yes []  No        Functional Scale:   FOTO Lumar 57  Date assessed: 1/10   FOTO Lumbar 40  Date assessed:       Latex Allergy:  [x]NO      []YES  Preferred Language for Healthcare:   [x]English       []other:      Pain level:  010 1/10     SUBJECTIVE:  1/10 Pt states he is doing well overall and currently not having any pain . OBJECTIVE: Updated below 1/10  Observation:   Gait: Independent no significant abnormalities noted 1/10   Functional Mobility/Transfers: 1/10  SLS: eyes open WNL R. Mild postural sway and pain on L that improved to WNL when cued for proper muscle activation.    Sit to stand: independent, mild trunk flexion noted   Test measurements:     Palpation  No tenderness upon palpation      ROM 1/10   Comments   Trunk flexion 90 deg Pain returning to neutral    Trunk extension 12 deg  Mild pain    Trunk R sidebend 100% of normal Pain returning to neutral    Trunk L sidebend 100% of normal  Pain returning to neutral    Trunk R rotation 90% of normal    Trunk L rotation 90% of normal    HS flexibility 158 L, 158 R Measured 90/90     Arcelia  Moderate restriction L, mild restriction on R       Strength 1/10 Left Right Comments   Hip abd   4+ 4+     Hip ext 4- pain 4+         RESTRICTIONS/PRECAUTIONS: Lumbar fusion L4-L5 2015    Exercises/Interventions:   ROM/stretches     DKTC to SB roll ins 10\"hx10  Start 12/16    LTR X30 bilat ^12/16 completed with LE resting on SB    SKTC Progressed with DKTC 12/16   Hamstring stretch 30\"hx3 bilat 12/14 supine with strap  1/6 cues to keep opposite knee bent   Piriformis stretch 30\"hx3 bilat 12/14 supine knee across chest  12/20 mild cues for proper direction of stretch    Lumbar rotation 30\"hx3 bilat ^12/30  side lying   Prone on elbows ^12/20             Strengthening     Prone hip extension Green TB 3x10 alt LE ^1/10    Clams Black TB 3x10 bilat ^1/3 side lying                        Neuro re-education      Glut sets D/c d/t progress 1/3   TA iso with overhead flexion Progressed with dead bug 1/3   TA marching Progressed with dead bug 1/3   Dead bug 3x10 alt UE/LE Start 1/3   Bridges 10\"h 1x10 DL ^1/3   BKFO Progressed with clams 12/30   SLS with hip abd 1/3        Manual Intervention      Hip PROM with STM Withheld d/t improvements made 1/10   STM Withheld d/t improvements made 1/10                                         Therapeutic Exercise and NMR EXR  [x] (83450) Provided verbal/tactile cueing for activities related to strengthening, flexibility, endurance, ROM  for improvements in proximal hip and core control with self care, mobility, lifting and ambulation.  [] (41417) Provided verbal/tactile cueing for activities related to improving balance, coordination, kinesthetic sense, posture, motor skill, proprioception  to assist with core control in self care, mobility, lifting, and ambulation. Therapeutic Activities:    [x] (44118 or 50640) Provided verbal/tactile cueing for activities related to improving balance, coordination, kinesthetic sense, posture, motor skill, proprioception and motor activation to allow for proper function  with self care and ADLs  [] (01106) Provided training and instruction to the patient for proper core and proximal hip recruitment and positioning with ambulation re-education     Home Exercise Program:    [x] (70401) Reviewed/Progressed HEP activities related to strengthening, flexibility, endurance, ROM of core, proximal hip and LE for functional self-care, mobility, lifting and ambulation   [] (93526) Reviewed/Progressed HEP activities related to improving balance, coordination, kinesthetic sense, posture, motor skill, proprioception of core, proximal hip and LE for self care, mobility, lifting, and ambulation      Manual Treatments:  PROM / STM / Oscillations-Mobs:  G-I, II, III, IV (PA's, Inf., Post.)  [] (43005) Provided manual therapy to mobilize proximal hip and LS spine soft tissue/joints for the purpose of modulating pain, promoting relaxation,  increasing ROM, reducing/eliminating soft tissue swelling/inflammation/restriction, improving soft tissue extensibility and allowing for proper ROM for normal function with self care, mobility, lifting and ambulation.      Modalities:  CP low back/ gluteals pt long sitting x10' 1/10    Charges:  Timed Code Treatment Minutes: 40'   Total Treatment Minutes: 4:14-5:13  61'       [] EVAL (LOW) 23086 (typically 20 minutes face-to-face)  [] EVAL (MOD) 68457 (typically 30 minutes face-to-face)  [] EVAL (HIGH) 60386 (typically 45 minutes face-to-face)  [] RE-EVAL     [x] MH(46757) x 2   [] IONTO  [x] NMR (70974) x  1   [] VASO  [] Manual (63417) x       [] Other:  [] TA x [] [facility] Traction (52355)  [] ES(attended) (96264)      [] ES (un) (68307):     Goals:   Patient stated goal: Move and complete daily activities without pain     [x] Progressing: [] Met: [] Not Met: [] Adjusted     Therapist goals for Patient:   Short Term Goals: To be achieved in: 2 weeks  1. Independent in HEP and progression per patient tolerance, in order to prevent re-injury. [] Progressing: [x] Met: [] Not Met: [] Adjusted   2. Patient will have a decrease in pain to facilitate improvement in movement, function, and ADLs as indicated by Functional Deficits. [] Progressing: [x] Met: [] Not Met: [] Adjusted     Long Term Goals: To be achieved in: 4 weeks  1. Patient will score 55 or higher FOTO lumbar spine indicating subject improvement in function   [] Progressing: [x] Met: [] Not Met: [] Adjusted  2. Patient will demonstrate increased trunk AROM to 20 deg extension in order to return from bent over positions without increase in pain or limitations. [x] Progressing: [] Met: [] Not Met: [] Adjusted  3. Patient will demonstrate an increase in hip strength to 4/5 bilaterally in order to go from sit to stand with proper sequencing and no increase in symptoms. [x] Progressing: [] Met: [] Not Met: [] Adjusted  4. Patient will demonstrate trunk AROM that is 100% of normal and pain free with rotations and side bends in order to complete bed mobility without pain or limitations   [x] Progressing: [] Met: [] Not Met: [] Adjusted  5. Patient will be able to ambulate for 30 minutes without pain or limitations d/t improvements in hip strength and LE flexibility   [] Progressing: [x] Met: [] Not Met: [] Adjusted       Overall Progression Towards Functional goals/ Treatment Progress Update:  [x] Patient is progressing as expected towards functional goals listed with increase in trunk mobility, LE flexibility and strength allowing for decrease in pain and increase in function.  Pt continues to have symptoms with movement related to mild ROM restrictions and strength deficits. Pt is appropriate for continued PT but will be on hold d/t traveling abroad. [] Progression is slowed due to complexities/Impairments listed. [] Progression has been slowed due to co-morbidities. [] Plan just implemented, too soon to assess goals progression <30days   [] Goals require adjustment due to lack of progress  [] Patient is not progressing as expected and requires additional follow up with physician  [] Other    Prognosis for POC: [x] Good [] Fair  [] Poor      Patient requires continued skilled intervention: [x] Yes  [] No    Treatment/Activity Tolerance:  [x] Patient able to complete treatment  [] Patient limited by fatigue  [] Patient limited by pain     [] Patient limited by other medical complications  [] Other: 1/10 Pt continues to require cues to slow down with exercises in order to focus on proper muscle activation and core stabilization. No tenderness/ tightness noted at lumbar paraspinals and therefore withheld manual therapy. Time spent reviewing proper form with exercises d/t pt traveling to [facility] for 2 months. Patient education:  1/10 Update HEP and reviewed importance of maintaining proper form and completing consistently while traveling. 1/3 Updated HEP   12/28 Updated HEP   12/20 Updated HEP   12/16 Updated HEP   12/14: Educated on objective findings and POC. Educate on proper muscle activation and sequencing with sit to stand to decrease stress placed on lumbar spine.      HEP instruction:   Access Code: [email or web address]      Prognosis: [x] Good [] Fair  [] Poor    Patient Requires Follow-up: [x] Yes  [] No    PLAN: See eval  [x] Continue per plan of care [] Alter current plan (see comments)  [x] Plan of care initiated [] Hold pending MD visit [] Discharge  1/10: Pt will be placed on hold d/t traveling abroad and if does not return to PT will be discharged    Electronically signed by: Chelita Pelletier, PT PT, DPT    *If patient does not return for further follow ups after this date. Please consider this as the patients discharge from physical therapy.

## 2023-06-21 ENCOUNTER — OFFICE VISIT (OUTPATIENT)
Dept: ORTHOPEDIC SURGERY | Age: 73
End: 2023-06-21

## 2023-06-21 VITALS — WEIGHT: 168 LBS | HEIGHT: 67 IN | BODY MASS INDEX: 26.37 KG/M2

## 2023-06-21 DIAGNOSIS — Z98.890 HISTORY OF LUMBAR SURGERY: ICD-10-CM

## 2023-06-21 DIAGNOSIS — Z98.1 HISTORY OF LUMBAR FUSION: Primary | ICD-10-CM

## 2023-06-21 DIAGNOSIS — M48.062 LUMBAR STENOSIS WITH NEUROGENIC CLAUDICATION: ICD-10-CM

## 2023-06-21 DIAGNOSIS — G89.29 CHRONIC BILATERAL LOW BACK PAIN WITH LEFT-SIDED SCIATICA: ICD-10-CM

## 2023-06-21 DIAGNOSIS — M54.42 CHRONIC BILATERAL LOW BACK PAIN WITH LEFT-SIDED SCIATICA: ICD-10-CM

## 2023-06-22 NOTE — PROGRESS NOTES
FOLLOW UP: SPINE    6/21/2023     CHIEF COMPLAINT:    Chief Complaint   Patient presents with    Back Pain     Lumbar pain       HISTORY OF PRESENT ILLNESS:              The patient is a 72 y.o. male here to follow-up after left L3 transforaminal and left L2-3 interlaminar SHANICE's 12/1/2022 for chronic worsening back pain. He has a history of a posterior L4-5 fusion in 2015 in [place]. He reports chronic residual left > right low back/buttock pain. Previous radiating left leg pain has improved. He states pain was significantly improved following recent SHANICE in December 2022 until the last 3 to 4 months. He states symptoms now feel \"lower down\" and is requesting SHANICE in the lower location. He states he has had lower lumbar SHANICE's in the past with significant benefit. His symptoms have increased over the last 1-2- years in general.  His pain is constant but increased with walking, bending or lifting. Some relief with changing position. Recent lumbar MRI scan has shown severe central stenosis at L3-4. He is not interested further surgical intervention. Conservative care includes MDP, NSAIDs, remote SHANICE in December 2021 with some improvement in [place] and [address] [place] [address],[address]. He does report some improvement at this time but still with daily pain limiting him functionally. He currently denies any contralateral radiating pain. Denies any progressive numbness tingling or extremity weakness. No recent bowel or bladder dysfunction or saddle anesthesia. He reports chronic urinary issues due to prostate enlargement over the last few years. No recent fevers chills or infections. No recent injury or trauma. Denies any side effects the procedure. Past Medical History:   Diagnosis Date    Enlarged prostate     Hyperlipidemia         The pain assessment was noted & reviewed in the medical record today.      Current/Past Treatment:   Physical Therapy: past  Chiropractic:     Injection:     12/21/22--Left L3 TF & left

## 2023-06-28 ENCOUNTER — HOSPITAL ENCOUNTER (OUTPATIENT)
Dept: INTERVENTIONAL RADIOLOGY/VASCULAR | Age: 73
Discharge: HOME | End: 2023-06-28
Payer: MEDICARE

## 2023-06-28 DIAGNOSIS — M48.062 LUMBAR STENOSIS WITH NEUROGENIC CLAUDICATION: ICD-10-CM

## 2023-06-28 PROCEDURE — 6360000004 HC RX CONTRAST MEDICATION: Performed by: RADIOLOGY

## 2023-06-28 PROCEDURE — 6360000002 HC RX W HCPCS

## 2023-06-28 PROCEDURE — 64483 NJX AA&/STRD TFRM EPI L/S 1: CPT

## 2023-06-28 PROCEDURE — 2709999900 HC NON-CHARGEABLE SUPPLY

## 2023-06-28 PROCEDURE — 62323 NJX INTERLAMINAR LMBR/SAC: CPT

## 2023-06-28 PROCEDURE — 2500000003 HC RX 250 WO HCPCS

## 2023-06-28 RX ADMIN — IOHEXOL 2 ML: 180 INJECTION INTRAVENOUS at 14:46

## 2023-06-29 ENCOUNTER — TELEPHONE (OUTPATIENT)
Dept: ORTHOPEDIC SURGERY | Age: 73
End: 2023-06-29

## 2023-07-10 ENCOUNTER — OFFICE VISIT (OUTPATIENT)
Dept: ORTHOPEDIC SURGERY | Age: 73
End: 2023-07-10
Payer: MEDICARE

## 2023-07-10 VITALS — BODY MASS INDEX: 26.37 KG/M2 | HEIGHT: 67 IN | WEIGHT: 168 LBS

## 2023-07-10 DIAGNOSIS — G89.29 CHRONIC PAIN OF LEFT KNEE: Primary | ICD-10-CM

## 2023-07-10 DIAGNOSIS — M25.562 CHRONIC PAIN OF LEFT KNEE: Primary | ICD-10-CM

## 2023-07-10 DIAGNOSIS — M48.062 LUMBAR STENOSIS WITH NEUROGENIC CLAUDICATION: ICD-10-CM

## 2023-07-10 DIAGNOSIS — M54.42 CHRONIC BILATERAL LOW BACK PAIN WITH LEFT-SIDED SCIATICA: ICD-10-CM

## 2023-07-10 DIAGNOSIS — Z98.1 HISTORY OF LUMBAR FUSION: ICD-10-CM

## 2023-07-10 DIAGNOSIS — G89.29 CHRONIC BILATERAL LOW BACK PAIN WITH LEFT-SIDED SCIATICA: ICD-10-CM

## 2023-07-10 PROCEDURE — 3017F COLORECTAL CA SCREEN DOC REV: CPT | Performed by: PHYSICIAN ASSISTANT

## 2023-07-10 PROCEDURE — 1123F ACP DISCUSS/DSCN MKR DOCD: CPT | Performed by: PHYSICIAN ASSISTANT

## 2023-07-10 PROCEDURE — 1036F TOBACCO NON-USER: CPT | Performed by: PHYSICIAN ASSISTANT

## 2023-07-10 PROCEDURE — G8419 CALC BMI OUT NRM PARAM NOF/U: HCPCS | Performed by: PHYSICIAN ASSISTANT

## 2023-07-10 PROCEDURE — 99213 OFFICE O/P EST LOW 20 MIN: CPT | Performed by: PHYSICIAN ASSISTANT

## 2023-07-10 PROCEDURE — G8427 DOCREV CUR MEDS BY ELIG CLIN: HCPCS | Performed by: PHYSICIAN ASSISTANT

## 2023-07-10 RX ORDER — MELOXICAM 15 MG/1
15 TABLET ORAL DAILY PRN
Qty: 30 TABLET | Refills: 0 | Status: SHIPPED | OUTPATIENT
Start: 2023-07-10 | End: 2023-08-09

## 2023-07-10 NOTE — PROGRESS NOTES
FOLLOW UP: SPINE    7/10/2023     CHIEF COMPLAINT:    Chief Complaint   Patient presents with    Follow Up After Procedure     SHANICE W/ NELDA 6/28/2023       HISTORY OF PRESENT ILLNESS:              The patient is a 72 y.o. male here to follow-up after bilateral L5 TF and L3-4 LESI #2 from 6/28/2023 for chronic worsening back pain. He has a history of a posterior L4-5 fusion in 2015 in [facility]. He reported chronic residual left > right low back/buttock pain. Previous radiating left leg pain has improved. His symptoms have increased over the last 1-2- years in general.  His pain was constant but increased with walking, bending or lifting. Some relief with changing position. Recent lumbar MRI scan has shown severe central stenosis at L3-4. He is not interested further surgical intervention. He currently reports 70% improvement since the procedure with improved functionality. He still does have some residual left low back and buttock discomfort. Other conservative care includes MDP, NSAIDs, remote SHANICE in December 2021 with some improvement in [facility] and [facility] [address] [place] [address]. He currently denies any radiating pain. Denies any progressive numbness tingling or extremity weakness. No recent bowel or bladder dysfunction or saddle anesthesia. He reports chronic urinary issues due to prostate enlargement over the last few years. No recent fevers chills or infections. No recent injury or trauma. Denies any side effects the procedure. Today he also reports a history of chronic left knee discomfort. He notes at times he will be \"limping\" due to his left knee pain. Denies any recent injury. The pain assessment was noted & reviewed in the medical record today.      Current/Past Treatment:   Physical Therapy: past  Chiropractic:     Injection:     12/21/22--Left L3 TF & left L2-3 [place] SHANICE, Dr. Webb Links relief x2-3mo  6/28/2023 Bilateral L5 transforaminal epidural injection & L3-4 translaminar epidural

## 2023-07-26 ENCOUNTER — OFFICE VISIT (OUTPATIENT)
Dept: ORTHOPEDIC SURGERY | Age: 73
End: 2023-07-26

## 2023-07-26 VITALS — HEIGHT: 67 IN | WEIGHT: 165 LBS | BODY MASS INDEX: 25.9 KG/M2

## 2023-07-26 DIAGNOSIS — M25.561 PAIN IN BOTH KNEES, UNSPECIFIED CHRONICITY: Primary | ICD-10-CM

## 2023-07-26 DIAGNOSIS — M25.562 PAIN IN BOTH KNEES, UNSPECIFIED CHRONICITY: Primary | ICD-10-CM

## 2023-07-26 RX ORDER — METHYLPREDNISOLONE ACETATE 40 MG/ML
40 INJECTION, SUSPENSION INTRA-ARTICULAR; INTRALESIONAL; INTRAMUSCULAR; SOFT TISSUE ONCE
Status: COMPLETED | OUTPATIENT
Start: 2023-07-26 | End: 2023-07-26

## 2023-07-26 RX ADMIN — METHYLPREDNISOLONE ACETATE 40 MG: 40 INJECTION, SUSPENSION INTRA-ARTICULAR; INTRALESIONAL; INTRAMUSCULAR; SOFT TISSUE at 17:22

## 2023-07-26 NOTE — PROGRESS NOTES
7/26/23 10:56 AM     Lidocaine Injection      NDC: [address]-17    Lot Number: [id number]    Body Part: bilateral knees

## 2023-07-26 NOTE — PROGRESS NOTES
Date:  2023    Name:  Donny March  Address:  [address] [place] [address]    :  1950      Age:   72 y.o.    SSN:        Medical Record Number:  [id number]    Reason for Visit:    Chief Complaint    Knee Pain (New  patient bilateral knees )      DOS:2023     HPI: Will Ramirez is a 72 y.o. male here today for evaluation of bilateral knee pain that has been ongoing chronically for years. He does not recall any recent injuries. He complains of bilateral knee pain, left worse than right. Pain is worse in the winter and exacerbated with standing and walking. The pain also wakes him up at night. Denies swelling, instability or mechanical symptoms. Has had previous arthroscopies bilaterally and corticosteroid injections back in  but has not had any recent treatment for his knees. Pain Assessment  Location of Pain: Knee  Location Modifiers: Left, Right  Severity of Pain: 4  Quality of Pain: Sharp  Duration of Pain: Persistent  Frequency of Pain: Intermittent  Aggravating Factors: Bending, Walking  Limiting Behavior: Yes  Relieving Factors: Rest  Result of Injury: No  Work-Related Injury: No  Are there other pain locations you wish to document?: No  ROS: Review of systems reviewed from Patient History Form completed today and available in the patient's chart under the Media tab. Past Medical History:   Diagnosis Date    Enlarged prostate     Hyperlipidemia         Past Surgical History:   Procedure Laterality Date    SPINE SURGERY  2017       History reviewed. No pertinent family history.     Social History     Socioeconomic History    Marital status:      Spouse name: None    Number of children: None    Years of education: None    Highest education level: None   Tobacco Use    Smoking status: Never    Smokeless tobacco: Never   Vaping Use    Vaping Use: Never used   Substance and Sexual Activity    Alcohol use: Never    Drug use: Never     Social

## 2023-08-08 ENCOUNTER — TELEPHONE (OUTPATIENT)
Dept: PRIMARY CARE CLINIC | Age: 73
End: 2023-08-08

## 2023-10-23 SDOH — ECONOMIC STABILITY: TRANSPORTATION INSECURITY
IN THE PAST 12 MONTHS, HAS LACK OF TRANSPORTATION KEPT YOU FROM MEETINGS, WORK, OR FROM GETTING THINGS NEEDED FOR DAILY LIVING?: NO

## 2023-10-23 SDOH — ECONOMIC STABILITY: HOUSING INSECURITY
IN THE LAST 12 MONTHS, WAS THERE A TIME WHEN YOU DID NOT HAVE A STEADY PLACE TO SLEEP OR SLEPT IN A SHELTER (INCLUDING NOW)?: NO

## 2023-10-23 SDOH — ECONOMIC STABILITY: FOOD INSECURITY: WITHIN THE PAST 12 MONTHS, THE FOOD YOU BOUGHT JUST DIDN'T LAST AND YOU DIDN'T HAVE MONEY TO GET MORE.: NEVER TRUE

## 2023-10-23 SDOH — ECONOMIC STABILITY: FOOD INSECURITY: WITHIN THE PAST 12 MONTHS, YOU WORRIED THAT YOUR FOOD WOULD RUN OUT BEFORE YOU GOT MONEY TO BUY MORE.: NEVER TRUE

## 2023-10-23 SDOH — ECONOMIC STABILITY: INCOME INSECURITY: HOW HARD IS IT FOR YOU TO PAY FOR THE VERY BASICS LIKE FOOD, HOUSING, MEDICAL CARE, AND HEATING?: SOMEWHAT HARD

## 2023-10-23 ASSESSMENT — PATIENT HEALTH QUESTIONNAIRE - PHQ9
SUM OF ALL RESPONSES TO PHQ QUESTIONS 1-9: 0
SUM OF ALL RESPONSES TO PHQ QUESTIONS 1-9: 0
2. FEELING DOWN, DEPRESSED OR HOPELESS: NOT AT ALL
1. LITTLE INTEREST OR PLEASURE IN DOING THINGS: 0
2. FEELING DOWN, DEPRESSED OR HOPELESS: 0
SUM OF ALL RESPONSES TO PHQ QUESTIONS 1-9: 0
1. LITTLE INTEREST OR PLEASURE IN DOING THINGS: NOT AT ALL
SUM OF ALL RESPONSES TO PHQ9 QUESTIONS 1 & 2: 0
SUM OF ALL RESPONSES TO PHQ9 QUESTIONS 1 & 2: 0
SUM OF ALL RESPONSES TO PHQ QUESTIONS 1-9: 0

## 2023-10-25 ENCOUNTER — OFFICE VISIT (OUTPATIENT)
Dept: FAMILY MEDICINE CLINIC | Age: 73
End: 2023-10-25
Payer: MEDICARE

## 2023-10-25 VITALS
WEIGHT: 165 LBS | SYSTOLIC BLOOD PRESSURE: 120 MMHG | HEIGHT: 67 IN | BODY MASS INDEX: 25.9 KG/M2 | DIASTOLIC BLOOD PRESSURE: 68 MMHG

## 2023-10-25 DIAGNOSIS — Z12.5 SCREENING PSA (PROSTATE SPECIFIC ANTIGEN): ICD-10-CM

## 2023-10-25 DIAGNOSIS — M25.50 ARTHRALGIA, UNSPECIFIED JOINT: ICD-10-CM

## 2023-10-25 DIAGNOSIS — E78.2 MIXED HYPERLIPIDEMIA: Primary | ICD-10-CM

## 2023-10-25 DIAGNOSIS — M48.061 SPINAL STENOSIS OF LUMBAR REGION WITHOUT NEUROGENIC CLAUDICATION: ICD-10-CM

## 2023-10-25 DIAGNOSIS — E78.2 MIXED HYPERLIPIDEMIA: ICD-10-CM

## 2023-10-25 DIAGNOSIS — M17.10 ARTHRITIS OF KNEE: ICD-10-CM

## 2023-10-25 LAB
ALBUMIN SERPL-MCNC: 4.5 G/DL (ref 3.4–5)
ALBUMIN/GLOB SERPL: 2 {RATIO} (ref 1.1–2.2)
ALP SERPL-CCNC: 79 U/L (ref 40–129)
ALT SERPL-CCNC: 15 U/L (ref 10–40)
ANION GAP SERPL CALCULATED.3IONS-SCNC: 11 MMOL/L (ref 3–16)
AST SERPL-CCNC: 21 U/L (ref 15–37)
BILIRUB SERPL-MCNC: 0.6 MG/DL (ref 0–1)
BUN SERPL-MCNC: 10 MG/DL (ref 7–20)
CALCIUM SERPL-MCNC: 9.5 MG/DL (ref 8.3–10.6)
CHLORIDE SERPL-SCNC: 105 MMOL/L (ref 99–110)
CHOLEST SERPL-MCNC: 153 MG/DL (ref 0–199)
CO2 SERPL-SCNC: 27 MMOL/L (ref 21–32)
CREAT SERPL-MCNC: 1.1 MG/DL (ref 0.8–1.3)
CRP SERPL-MCNC: <3 MG/L (ref 0–5.1)
DEPRECATED RDW RBC AUTO: 13.5 % (ref 12.4–15.4)
ERYTHROCYTE [SEDIMENTATION RATE] IN BLOOD BY WESTERGREN METHOD: 10 MM/HR (ref 0–20)
GFR SERPLBLD CREATININE-BSD FMLA CKD-EPI: >60 ML/MIN/{1.73_M2}
GLUCOSE SERPL-MCNC: 101 MG/DL (ref 70–99)
HCT VFR BLD AUTO: 39.5 % (ref 40.5–52.5)
HDLC SERPL-MCNC: 56 MG/DL (ref 40–60)
HGB BLD-MCNC: 13.2 G/DL (ref 13.5–17.5)
LDLC SERPL CALC-MCNC: 76 MG/DL
MCH RBC QN AUTO: 29.9 PG (ref 26–34)
MCHC RBC AUTO-ENTMCNC: 33.4 G/DL (ref 31–36)
MCV RBC AUTO: 89.4 FL (ref 80–100)
PLATELET # BLD AUTO: 210 K/UL (ref 135–450)
PMV BLD AUTO: 9 FL (ref 5–10.5)
POTASSIUM SERPL-SCNC: 4.4 MMOL/L (ref 3.5–5.1)
PROT SERPL-MCNC: 6.8 G/DL (ref 6.4–8.2)
PSA SERPL DL<=0.01 NG/ML-MCNC: 1.62 NG/ML (ref 0–4)
RBC # BLD AUTO: 4.42 M/UL (ref 4.2–5.9)
SODIUM SERPL-SCNC: 143 MMOL/L (ref 136–145)
TRIGL SERPL-MCNC: 103 MG/DL (ref 0–150)
URATE SERPL-MCNC: 6 MG/DL (ref 3.5–7.2)
VLDLC SERPL CALC-MCNC: 21 MG/DL
WBC # BLD AUTO: 5.6 K/UL (ref 4–11)

## 2023-10-25 PROCEDURE — G0008 ADMIN INFLUENZA VIRUS VAC: HCPCS | Performed by: FAMILY MEDICINE

## 2023-10-25 PROCEDURE — 1036F TOBACCO NON-USER: CPT | Performed by: FAMILY MEDICINE

## 2023-10-25 PROCEDURE — G8419 CALC BMI OUT NRM PARAM NOF/U: HCPCS | Performed by: FAMILY MEDICINE

## 2023-10-25 PROCEDURE — 90694 VACC AIIV4 NO PRSRV 0.5ML IM: CPT | Performed by: FAMILY MEDICINE

## 2023-10-25 PROCEDURE — G8428 CUR MEDS NOT DOCUMENT: HCPCS | Performed by: FAMILY MEDICINE

## 2023-10-25 PROCEDURE — G8484 FLU IMMUNIZE NO ADMIN: HCPCS | Performed by: FAMILY MEDICINE

## 2023-10-25 PROCEDURE — 3017F COLORECTAL CA SCREEN DOC REV: CPT | Performed by: FAMILY MEDICINE

## 2023-10-25 PROCEDURE — 1123F ACP DISCUSS/DSCN MKR DOCD: CPT | Performed by: FAMILY MEDICINE

## 2023-10-25 PROCEDURE — 99214 OFFICE O/P EST MOD 30 MIN: CPT | Performed by: FAMILY MEDICINE

## 2023-10-25 RX ORDER — TAMSULOSIN HYDROCHLORIDE 0.4 MG/1
0.4 CAPSULE ORAL DAILY
Qty: 90 CAPSULE | Refills: 3 | Status: SHIPPED | OUTPATIENT
Start: 2023-10-25

## 2023-10-25 RX ORDER — ATORVASTATIN CALCIUM 20 MG/1
20 TABLET, FILM COATED ORAL DAILY
Qty: 90 TABLET | Refills: 3 | Status: SHIPPED | OUTPATIENT
Start: 2023-10-25

## 2023-10-25 RX ORDER — CELECOXIB 200 MG/1
200 CAPSULE ORAL 2 TIMES DAILY
Qty: 60 CAPSULE | Refills: 3 | Status: SHIPPED | OUTPATIENT
Start: 2023-10-25

## 2023-10-25 NOTE — PROGRESS NOTES
Brendon Raphael Link (:  1950) is a 72 y.o. male,Established patient, here for evaluation of the following chief complaint(s):  Joint Pain (Knee pains, lower back pain /Stiff pain   sees ortho but did not help/Would like lad work including PSA)         ASSESSMENT/PLAN:  Mechelle Ahumada was seen today for joint pain. Diagnoses and all orders for this visit:    Mixed hyperlipidemia  -     Lipid Panel; Future  -     Comprehensive Metabolic Panel; Future  Stable on lipitor  Arthritis of knee  Not well controlled  No relief with steroid  Meloxicam not helping. Can switch to celebrex  Medication side affects and adverse reactions reviewed. Suggested follow up with ortho for discussion re: symvisc  Spinal stenosis of lumbar region without neurogenic claudication  Not well controlled  Prior laminectomy  Severe lumbar stenosis but no claudication. Continue pt  Screening PSA (prostate specific antigen)  -     PSA Screening; Future    Arthralgia, unspecified joint  -     CBC; Future  -     Sedimentation Rate; Future  -     C-Reactive Protein; Future  -     Uric Acid; Future  Pt requests blood work to evaluate  Other orders  -     Influenza, FLUAD, (age 72 y+), IM, Preservative Free, 0.5 mL  -     atorvastatin (LIPITOR) 20 MG tablet; Take 1 tablet by mouth daily  -     tamsulosin (FLOMAX) 0.4 MG capsule; Take 1 capsule by mouth daily  -     celecoxib (CELEBREX) 200 MG capsule; Take 1 capsule by mouth 2 times daily         No follow-ups on file. Subjective   SUBJECTIVE/OBJECTIVE:  HPI  Pt is a of 72 y.o. male comes in today with   Chief Complaint   Patient presents with    Joint Pain     Knee pains, lower back pain   Stiff pain   sees ortho but did not help  Would like lad work including PSA   Has been having knee pain. Tried injection. Pain not improving. Gets really stiff after inactivity. Back still hurting. Re helped for a few weeks only. Has been doing therapy.   Vitals:    10/25/23 1029

## 2023-11-01 ENCOUNTER — OFFICE VISIT (OUTPATIENT)
Dept: ORTHOPEDIC SURGERY | Age: 73
End: 2023-11-01
Payer: MEDICARE

## 2023-11-01 VITALS — WEIGHT: 165 LBS | BODY MASS INDEX: 25.9 KG/M2 | HEIGHT: 67 IN

## 2023-11-01 DIAGNOSIS — M17.0 PRIMARY OSTEOARTHRITIS OF BOTH KNEES: Primary | ICD-10-CM

## 2023-11-01 PROCEDURE — G8484 FLU IMMUNIZE NO ADMIN: HCPCS | Performed by: ORTHOPAEDIC SURGERY

## 2023-11-01 PROCEDURE — G8419 CALC BMI OUT NRM PARAM NOF/U: HCPCS | Performed by: ORTHOPAEDIC SURGERY

## 2023-11-01 PROCEDURE — 99213 OFFICE O/P EST LOW 20 MIN: CPT | Performed by: ORTHOPAEDIC SURGERY

## 2023-11-01 PROCEDURE — G8427 DOCREV CUR MEDS BY ELIG CLIN: HCPCS | Performed by: ORTHOPAEDIC SURGERY

## 2023-11-01 PROCEDURE — 3017F COLORECTAL CA SCREEN DOC REV: CPT | Performed by: ORTHOPAEDIC SURGERY

## 2023-11-01 PROCEDURE — 1123F ACP DISCUSS/DSCN MKR DOCD: CPT | Performed by: ORTHOPAEDIC SURGERY

## 2023-11-01 PROCEDURE — 1036F TOBACCO NON-USER: CPT | Performed by: ORTHOPAEDIC SURGERY

## 2023-11-07 ENCOUNTER — TELEPHONE (OUTPATIENT)
Dept: ORTHOPEDIC SURGERY | Age: 73
End: 2023-11-07

## 2023-11-07 NOTE — TELEPHONE ENCOUNTER
General Question     Subject: INSURANCE APPROVAL FOR INJ?   Patient and /or Facility Request: Brendon Bender  Contact Number: [phone]     Pt HAS APPT FOR TOMORROW AND HE IS CALLING TODAY TO MAKE SURE HIS INJECTION IS APPROVED BY INSURANCE     PLEASE CALL TO CONFIRM WITH Pt @ THE # ABOVE.

## 2023-11-07 NOTE — TELEPHONE ENCOUNTER
General Question     Subject: INJECTION STATUS   Patient and /or Facility Request: Brendon Bender  Contact Number: [phone]      PATIENT CALLED IN TO SEE IF HE CAN SPEAK TO SOMEONE IN THE OFFICE REGARDING HIS INJECTION. PATIENT WANTS TO KNOW IF HE BEEN APPROVE FOR HIS INJECTION. BEFORE HIS APPT TOMORROW ON 11-8-2023...    PLEASE ADVISE

## 2023-11-08 ENCOUNTER — OFFICE VISIT (OUTPATIENT)
Dept: ORTHOPEDIC SURGERY | Age: 73
End: 2023-11-08

## 2023-11-08 VITALS — BODY MASS INDEX: 25.9 KG/M2 | HEIGHT: 67 IN | WEIGHT: 165 LBS

## 2023-11-08 DIAGNOSIS — M17.0 PRIMARY OSTEOARTHRITIS OF BOTH KNEES: Primary | ICD-10-CM

## 2023-11-08 NOTE — PROGRESS NOTES
between the patient and Dr. Andry Rankin. 11/8/2023 11:52 AM    This dictation was performed with a verbal recognition program (DRAGON) and it was checked for errors. It is possible that there are still dictated errors within this office note. If so, please bring any areas to my attention for an addendum. All efforts were made to ensure that this office note is accurate. I attest that I met personally with the patient, performed the described exam, reviewed the radiographic studies and medical records associated with this patient and supervised the services that are described above.      Jose Montes MD

## 2024-08-09 ASSESSMENT — PATIENT HEALTH QUESTIONNAIRE - PHQ9
3. TROUBLE FALLING OR STAYING ASLEEP: NEARLY EVERY DAY
8. MOVING OR SPEAKING SO SLOWLY THAT OTHER PEOPLE COULD HAVE NOTICED. OR THE OPPOSITE - BEING SO FIDGETY OR RESTLESS THAT YOU HAVE BEEN MOVING AROUND A LOT MORE THAN USUAL: NEARLY EVERY DAY
2. FEELING DOWN, DEPRESSED OR HOPELESS: NOT AT ALL
7. TROUBLE CONCENTRATING ON THINGS, SUCH AS READING THE NEWSPAPER OR WATCHING TELEVISION: SEVERAL DAYS
4. FEELING TIRED OR HAVING LITTLE ENERGY: SEVERAL DAYS
SUM OF ALL RESPONSES TO PHQ QUESTIONS 1-9: 11
4. FEELING TIRED OR HAVING LITTLE ENERGY: SEVERAL DAYS
1. LITTLE INTEREST OR PLEASURE IN DOING THINGS: NEARLY EVERY DAY
6. FEELING BAD ABOUT YOURSELF - OR THAT YOU ARE A FAILURE OR HAVE LET YOURSELF OR YOUR FAMILY DOWN: NOT AT ALL
10. IF YOU CHECKED OFF ANY PROBLEMS, HOW DIFFICULT HAVE THESE PROBLEMS MADE IT FOR YOU TO DO YOUR WORK, TAKE CARE OF THINGS AT HOME, OR GET ALONG WITH OTHER PEOPLE: SOMEWHAT DIFFICULT
2. FEELING DOWN, DEPRESSED OR HOPELESS: NOT AT ALL
6. FEELING BAD ABOUT YOURSELF - OR THAT YOU ARE A FAILURE OR HAVE LET YOURSELF OR YOUR FAMILY DOWN: NOT AT ALL
5. POOR APPETITE OR OVEREATING: NOT AT ALL
3. TROUBLE FALLING OR STAYING ASLEEP: NEARLY EVERY DAY
SUM OF ALL RESPONSES TO PHQ9 QUESTIONS 1 & 2: 3
SUM OF ALL RESPONSES TO PHQ QUESTIONS 1-9: 11
9. THOUGHTS THAT YOU WOULD BE BETTER OFF DEAD, OR OF HURTING YOURSELF: NOT AT ALL
7. TROUBLE CONCENTRATING ON THINGS, SUCH AS READING THE NEWSPAPER OR WATCHING TELEVISION: SEVERAL DAYS
SUM OF ALL RESPONSES TO PHQ9 QUESTIONS 1 & 2: 3
10. IF YOU CHECKED OFF ANY PROBLEMS, HOW DIFFICULT HAVE THESE PROBLEMS MADE IT FOR YOU TO DO YOUR WORK, TAKE CARE OF THINGS AT HOME, OR GET ALONG WITH OTHER PEOPLE: SOMEWHAT DIFFICULT
1. LITTLE INTEREST OR PLEASURE IN DOING THINGS: NEARLY EVERY DAY
SUM OF ALL RESPONSES TO PHQ QUESTIONS 1-9: 11
SUM OF ALL RESPONSES TO PHQ QUESTIONS 1-9: 11
9. THOUGHTS THAT YOU WOULD BE BETTER OFF DEAD, OR OF HURTING YOURSELF: NOT AT ALL
SUM OF ALL RESPONSES TO PHQ QUESTIONS 1-9: 11
8. MOVING OR SPEAKING SO SLOWLY THAT OTHER PEOPLE COULD HAVE NOTICED. OR THE OPPOSITE, BEING SO FIGETY OR RESTLESS THAT YOU HAVE BEEN MOVING AROUND A LOT MORE THAN USUAL: NEARLY EVERY DAY

## 2024-08-12 ENCOUNTER — OFFICE VISIT (OUTPATIENT)
Dept: FAMILY MEDICINE CLINIC | Age: 74
End: 2024-08-12
Payer: MEDICARE

## 2024-08-12 VITALS
OXYGEN SATURATION: 98 % | WEIGHT: 175 LBS | TEMPERATURE: 97.5 F | DIASTOLIC BLOOD PRESSURE: 68 MMHG | BODY MASS INDEX: 27.47 KG/M2 | HEART RATE: 69 BPM | SYSTOLIC BLOOD PRESSURE: 124 MMHG | HEIGHT: 67 IN

## 2024-08-12 DIAGNOSIS — M96.1 FAILED BACK SYNDROME, LUMBAR: ICD-10-CM

## 2024-08-12 DIAGNOSIS — Z12.5 SCREENING PSA (PROSTATE SPECIFIC ANTIGEN): ICD-10-CM

## 2024-08-12 DIAGNOSIS — D64.9 ANEMIA, UNSPECIFIED TYPE: ICD-10-CM

## 2024-08-12 DIAGNOSIS — M48.061 SPINAL STENOSIS OF LUMBAR REGION WITHOUT NEUROGENIC CLAUDICATION: Primary | ICD-10-CM

## 2024-08-12 DIAGNOSIS — E78.2 MIXED HYPERLIPIDEMIA: ICD-10-CM

## 2024-08-12 PROCEDURE — G8427 DOCREV CUR MEDS BY ELIG CLIN: HCPCS | Performed by: FAMILY MEDICINE

## 2024-08-12 PROCEDURE — G8419 CALC BMI OUT NRM PARAM NOF/U: HCPCS | Performed by: FAMILY MEDICINE

## 2024-08-12 PROCEDURE — 1036F TOBACCO NON-USER: CPT | Performed by: FAMILY MEDICINE

## 2024-08-12 PROCEDURE — 3017F COLORECTAL CA SCREEN DOC REV: CPT | Performed by: FAMILY MEDICINE

## 2024-08-12 PROCEDURE — 1123F ACP DISCUSS/DSCN MKR DOCD: CPT | Performed by: FAMILY MEDICINE

## 2024-08-12 PROCEDURE — 99214 OFFICE O/P EST MOD 30 MIN: CPT | Performed by: FAMILY MEDICINE

## 2024-08-12 RX ORDER — GABAPENTIN 300 MG/1
300 CAPSULE ORAL 3 TIMES DAILY
Qty: 90 CAPSULE | Refills: 0 | Status: SHIPPED | OUTPATIENT
Start: 2024-08-12 | End: 2024-09-11

## 2024-08-12 RX ORDER — CELECOXIB 200 MG/1
200 CAPSULE ORAL 2 TIMES DAILY
Qty: 180 CAPSULE | Refills: 1 | Status: SHIPPED | OUTPATIENT
Start: 2024-08-12

## 2024-08-12 RX ORDER — TAMSULOSIN HYDROCHLORIDE 0.4 MG/1
0.4 CAPSULE ORAL DAILY
Qty: 90 CAPSULE | Refills: 3 | Status: SHIPPED | OUTPATIENT
Start: 2024-08-12

## 2024-08-12 RX ORDER — ATORVASTATIN CALCIUM 20 MG/1
20 TABLET, FILM COATED ORAL DAILY
Qty: 90 TABLET | Refills: 3 | Status: SHIPPED | OUTPATIENT
Start: 2024-08-12

## 2024-08-12 NOTE — PROGRESS NOTES
Brendon Bender (:  1950) is a 74 y.o. male,Established patient, here for evaluation of the following chief complaint(s):  Follow-up and Lower Back Pain (Constant, he has had 2 GUERO no help, wants a referral and pain meds)      Assessment & Plan   ASSESSMENT/PLAN:  Brendon was seen today for follow-up and lower back pain.    Diagnoses and all orders for this visit:    Spinal stenosis of lumbar region without neurogenic claudication  -     Javon Jaramillo MD, Neurosurgery (Spine), [facility]  -     gabapentin (NEURONTIN) 300 MG capsule; Take 1 capsule by mouth 3 times daily for 30 days.  Not well controlled. Referred to neurosurg  Trial of gabapentin since pain not controlled  Medication side affects and adverse reactions reviewed.   Failed back syndrome, lumbar  -     Javon Jaramillo MD, Neurosurgery (Spine), [facility]    Mixed hyperlipidemia  -     Lipid Panel; Future  -     Comprehensive Metabolic Panel; Future  Stable on statin  Screening PSA (prostate specific antigen)  -     PSA Screening; Future    Anemia, unspecified type  -     CBC; Future  -     Vitamin B12 & Folate; Future  -     Ferritin; Future  Recheck blood work to see if stable  Other orders  -     atorvastatin (LIPITOR) 20 MG tablet; Take 1 tablet by mouth daily  -     celecoxib (CELEBREX) 200 MG capsule; Take 1 capsule by mouth 2 times daily  -     tamsulosin (FLOMAX) 0.4 MG capsule; Take 1 capsule by mouth daily         No follow-ups on file.         Subjective   SUBJECTIVE/OBJECTIVE:  HPI  Pt is a of 74 y.o. male comes in today with   Chief Complaint   Patient presents with    Follow-up    Lower Back Pain     Constant, he has had 2 GUERO no help, wants a referral and pain meds     L>R low back pain.   Has had 2 guero. No improvement.    Vitals:    24 1446   BP: 124/68   Pulse: 69   Temp: 97.5 °F (36.4 °C)   TempSrc: Temporal   SpO2: 98%   Weight: 79.4 kg (175 lb)   Height: 1.702 m (5' 7\")

## 2024-08-14 DIAGNOSIS — D64.9 ANEMIA, UNSPECIFIED TYPE: ICD-10-CM

## 2024-08-14 DIAGNOSIS — Z12.5 SCREENING PSA (PROSTATE SPECIFIC ANTIGEN): ICD-10-CM

## 2024-08-14 DIAGNOSIS — E78.2 MIXED HYPERLIPIDEMIA: ICD-10-CM

## 2024-08-14 LAB
ALBUMIN SERPL-MCNC: 4.3 G/DL (ref 3.4–5)
ALBUMIN/GLOB SERPL: 2 {RATIO} (ref 1.1–2.2)
ALP SERPL-CCNC: 75 U/L (ref 40–129)
ALT SERPL-CCNC: 18 U/L (ref 10–40)
ANION GAP SERPL CALCULATED.3IONS-SCNC: 13 MMOL/L (ref 3–16)
AST SERPL-CCNC: 25 U/L (ref 15–37)
BILIRUB SERPL-MCNC: 0.7 MG/DL (ref 0–1)
BUN SERPL-MCNC: 13 MG/DL (ref 7–20)
CALCIUM SERPL-MCNC: 9.3 MG/DL (ref 8.3–10.6)
CHLORIDE SERPL-SCNC: 103 MMOL/L (ref 99–110)
CHOLEST SERPL-MCNC: 159 MG/DL (ref 0–199)
CO2 SERPL-SCNC: 25 MMOL/L (ref 21–32)
CREAT SERPL-MCNC: 1.2 MG/DL (ref 0.8–1.3)
DEPRECATED RDW RBC AUTO: 13.7 % (ref 12.4–15.4)
FERRITIN SERPL IA-MCNC: 86.3 NG/ML (ref 30–400)
GFR SERPLBLD CREATININE-BSD FMLA CKD-EPI: 64 ML/MIN/{1.73_M2}
GLUCOSE SERPL-MCNC: 94 MG/DL (ref 70–99)
HCT VFR BLD AUTO: 39 % (ref 40.5–52.5)
HDLC SERPL-MCNC: 60 MG/DL (ref 40–60)
HGB BLD-MCNC: 13 G/DL (ref 13.5–17.5)
LDLC SERPL CALC-MCNC: 82 MG/DL
MCH RBC QN AUTO: 29.7 PG (ref 26–34)
MCHC RBC AUTO-ENTMCNC: 33.3 G/DL (ref 31–36)
MCV RBC AUTO: 89.1 FL (ref 80–100)
PLATELET # BLD AUTO: 205 K/UL (ref 135–450)
PMV BLD AUTO: 8.9 FL (ref 5–10.5)
POTASSIUM SERPL-SCNC: 4.9 MMOL/L (ref 3.5–5.1)
PROT SERPL-MCNC: 6.4 G/DL (ref 6.4–8.2)
PSA SERPL DL<=0.01 NG/ML-MCNC: 2.05 NG/ML (ref 0–4)
RBC # BLD AUTO: 4.37 M/UL (ref 4.2–5.9)
SODIUM SERPL-SCNC: 141 MMOL/L (ref 136–145)
TRIGL SERPL-MCNC: 85 MG/DL (ref 0–150)
VLDLC SERPL CALC-MCNC: 17 MG/DL
WBC # BLD AUTO: 5.7 K/UL (ref 4–11)

## 2024-08-22 LAB
FOLATE SERPL-MCNC: >40 NG/ML (ref 4.78–24.2)
VIT B12 SERPL-MCNC: 1380 PG/ML (ref 211–911)

## 2024-10-10 ENCOUNTER — OFFICE VISIT (OUTPATIENT)
Dept: FAMILY MEDICINE CLINIC | Age: 74
End: 2024-10-10
Payer: MEDICARE

## 2024-10-10 VITALS
HEIGHT: 67 IN | DIASTOLIC BLOOD PRESSURE: 50 MMHG | HEART RATE: 80 BPM | SYSTOLIC BLOOD PRESSURE: 110 MMHG | WEIGHT: 170 LBS | OXYGEN SATURATION: 98 % | BODY MASS INDEX: 26.68 KG/M2

## 2024-10-10 DIAGNOSIS — Z01.818 PREOP EXAMINATION: Primary | ICD-10-CM

## 2024-10-10 DIAGNOSIS — E78.2 MIXED HYPERLIPIDEMIA: ICD-10-CM

## 2024-10-10 PROCEDURE — G8484 FLU IMMUNIZE NO ADMIN: HCPCS | Performed by: FAMILY MEDICINE

## 2024-10-10 PROCEDURE — 1123F ACP DISCUSS/DSCN MKR DOCD: CPT | Performed by: FAMILY MEDICINE

## 2024-10-10 PROCEDURE — G8419 CALC BMI OUT NRM PARAM NOF/U: HCPCS | Performed by: FAMILY MEDICINE

## 2024-10-10 PROCEDURE — 3017F COLORECTAL CA SCREEN DOC REV: CPT | Performed by: FAMILY MEDICINE

## 2024-10-10 PROCEDURE — 1036F TOBACCO NON-USER: CPT | Performed by: FAMILY MEDICINE

## 2024-10-10 PROCEDURE — 99213 OFFICE O/P EST LOW 20 MIN: CPT | Performed by: FAMILY MEDICINE

## 2024-10-10 PROCEDURE — G8427 DOCREV CUR MEDS BY ELIG CLIN: HCPCS | Performed by: FAMILY MEDICINE

## 2024-10-10 ASSESSMENT — ENCOUNTER SYMPTOMS: RESPIRATORY NEGATIVE: 1

## 2024-10-10 NOTE — PROGRESS NOTES
Brendon Bender (:  1950) is a 74 y.o. male,Established patient, here for evaluation of the following chief complaint(s):  Pre-op Exam ([facility] /[facility]/Left eye 10/16/24 AND Right eye10/30/24/Cataract / Glaucoma)      Assessment & Plan   ASSESSMENT/PLAN:  Brendon was seen today for pre-op exam.    Diagnoses and all orders for this visit:    Preop examination  Medically patient is at acceptable risk to undergo planned surgery.   Mixed hyperlipidemia  Stable on lipitor       No follow-ups on file.         Subjective   SUBJECTIVE/OBJECTIVE:  HPI  Pt is a of 74 y.o. male comes in today with   Chief Complaint   Patient presents with    Pre-op Exam     [facility]  Left eye 10/16/24 AND Right eye10/30/24  Cataract / Glaucoma   No personal or family history of adverse reaction to anesthesia or bleeding tendency.    Vitals:    10/10/24 1031   BP: (!) 110/50   Pulse: 80   SpO2: 98%   Weight: 77.1 kg (170 lb)   Height: 1.702 m (5' 7\")     No Known Allergies    Current Outpatient Medications on File Prior to Visit   Medication Sig Dispense Refill    atorvastatin (LIPITOR) 20 MG tablet Take 1 tablet by mouth daily 90 tablet 3    celecoxib (CELEBREX) 200 MG capsule Take 1 capsule by mouth 2 times daily 180 capsule 1    tamsulosin (FLOMAX) 0.4 MG capsule Take 1 capsule by mouth daily 90 capsule 3    gabapentin (NEURONTIN) 300 MG capsule Take 1 capsule by mouth 3 times daily for 30 days. 90 capsule 0     No current facility-administered medications on file prior to visit.       Past Medical History:   Diagnosis Date    Enlarged prostate     Hyperlipidemia        Past Surgical History:   Procedure Laterality Date    SPINE SURGERY  2017       Social History     Socioeconomic History    Marital status:    Tobacco Use    Smoking status: Never    Smokeless tobacco: Never   Vaping Use    Vaping status: Never Used   Substance and Sexual Activity    Alcohol use: Never    Drug use:

## 2024-10-11 ENCOUNTER — TELEPHONE (OUTPATIENT)
Dept: FAMILY MEDICINE CLINIC | Age: 74
End: 2024-10-11

## 2024-10-11 NOTE — TELEPHONE ENCOUNTER
Pt stopped in at  and dropped off a copy of his 9 vaccines  Scanned into  and placed in Dr Higgins's bin

## 2024-10-14 ENCOUNTER — TELEPHONE (OUTPATIENT)
Dept: FAMILY MEDICINE CLINIC | Age: 74
End: 2024-10-14

## 2025-02-10 ENCOUNTER — OFFICE VISIT (OUTPATIENT)
Dept: FAMILY MEDICINE CLINIC | Age: 75
End: 2025-02-10

## 2025-02-10 VITALS
DIASTOLIC BLOOD PRESSURE: 70 MMHG | HEART RATE: 67 BPM | SYSTOLIC BLOOD PRESSURE: 126 MMHG | HEIGHT: 67 IN | OXYGEN SATURATION: 98 % | WEIGHT: 178.4 LBS | BODY MASS INDEX: 28 KG/M2

## 2025-02-10 DIAGNOSIS — Z01.818 PRE-OP EXAM: Primary | ICD-10-CM

## 2025-02-10 DIAGNOSIS — E78.2 MIXED HYPERLIPIDEMIA: ICD-10-CM

## 2025-02-10 DIAGNOSIS — M48.061 SPINAL STENOSIS OF LUMBAR REGION WITHOUT NEUROGENIC CLAUDICATION: ICD-10-CM

## 2025-02-10 RX ORDER — TAMSULOSIN HYDROCHLORIDE 0.4 MG/1
0.4 CAPSULE ORAL DAILY
Qty: 90 CAPSULE | Refills: 3 | Status: SHIPPED | OUTPATIENT
Start: 2025-02-10

## 2025-02-10 SDOH — ECONOMIC STABILITY: FOOD INSECURITY: WITHIN THE PAST 12 MONTHS, YOU WORRIED THAT YOUR FOOD WOULD RUN OUT BEFORE YOU GOT MONEY TO BUY MORE.: NEVER TRUE

## 2025-02-10 SDOH — ECONOMIC STABILITY: FOOD INSECURITY: WITHIN THE PAST 12 MONTHS, THE FOOD YOU BOUGHT JUST DIDN'T LAST AND YOU DIDN'T HAVE MONEY TO GET MORE.: NEVER TRUE

## 2025-02-10 ASSESSMENT — PATIENT HEALTH QUESTIONNAIRE - PHQ9
7. TROUBLE CONCENTRATING ON THINGS, SUCH AS READING THE NEWSPAPER OR WATCHING TELEVISION: SEVERAL DAYS
SUM OF ALL RESPONSES TO PHQ QUESTIONS 1-9: 11
5. POOR APPETITE OR OVEREATING: NOT AT ALL
8. MOVING OR SPEAKING SO SLOWLY THAT OTHER PEOPLE COULD HAVE NOTICED. OR THE OPPOSITE, BEING SO FIGETY OR RESTLESS THAT YOU HAVE BEEN MOVING AROUND A LOT MORE THAN USUAL: NEARLY EVERY DAY
10. IF YOU CHECKED OFF ANY PROBLEMS, HOW DIFFICULT HAVE THESE PROBLEMS MADE IT FOR YOU TO DO YOUR WORK, TAKE CARE OF THINGS AT HOME, OR GET ALONG WITH OTHER PEOPLE: SOMEWHAT DIFFICULT
6. FEELING BAD ABOUT YOURSELF - OR THAT YOU ARE A FAILURE OR HAVE LET YOURSELF OR YOUR FAMILY DOWN: NOT AT ALL
1. LITTLE INTEREST OR PLEASURE IN DOING THINGS: NEARLY EVERY DAY
SUM OF ALL RESPONSES TO PHQ QUESTIONS 1-9: 11
4. FEELING TIRED OR HAVING LITTLE ENERGY: SEVERAL DAYS
SUM OF ALL RESPONSES TO PHQ9 QUESTIONS 1 & 2: 3
3. TROUBLE FALLING OR STAYING ASLEEP: NEARLY EVERY DAY
9. THOUGHTS THAT YOU WOULD BE BETTER OFF DEAD, OR OF HURTING YOURSELF: NOT AT ALL
SUM OF ALL RESPONSES TO PHQ QUESTIONS 1-9: 11
2. FEELING DOWN, DEPRESSED OR HOPELESS: NOT AT ALL
SUM OF ALL RESPONSES TO PHQ QUESTIONS 1-9: 11

## 2025-02-10 ASSESSMENT — ENCOUNTER SYMPTOMS: RESPIRATORY NEGATIVE: 1

## 2025-02-10 NOTE — TELEPHONE ENCOUNTER
Medication:   Requested Prescriptions     Pending Prescriptions Disp Refills    tamsulosin (FLOMAX) 0.4 MG capsule [Pharmacy Med Name: tamsulosin 0.4 mg capsule] 90 capsule 3     Sig: TAKE 1 CAPSULE BY MOUTH DAILY       Last Filled:  8/12/24    Patient Phone Number: [phone] (home)     Last appt: 2/10/2025   Next appt: Visit date not found    Last Labs DM: No results found for: \"LABA1C\"  Last Lipid:   Lab Results   Component Value Date/Time    CHOL 159 08/14/2024 11:20 AM    TRIG 85 08/14/2024 11:20 AM    HDL 60 08/14/2024 11:20 AM     Last PSA:   Lab Results   Component Value Date/Time    PSA 2.05 08/14/2024 11:20 AM     Last Thyroid: No results found for: \"TSH\", \"FT3\", \"Z2NCMTB\", \"T4FREE\"

## 2025-02-10 NOTE — PROGRESS NOTES
[facility]   part of [facility]     Hospitalist Progress Note     Soumya King Patient Status:  Inpatient    6/3/1960 MRN [id number]   Location [facility] 5NW-A Attending Apurva Logan MD   Hosp Day # 11 PCP Rika Vinson DO     Subjective:   Still with pain and didn't sleep well     Objective:    Review of Systems:   A comprehensive review of systems was completed; pertinent positive and negatives stated in subjective.    Vital signs:  Temp:  [97 °F (36.1 °C)-97.9 °F (36.6 °C)] 97.5 °F (36.4 °C)  Pulse:  [] 96  Resp:  [7-29] 10  BP: ()/(45-88) 92/49  SpO2:  [91 %-100 %] 97 %    Physical Exam:    General: No acute distress, in pain   Respiratory: No wheezes, no rhonchi  Cardiovascular: S1, S2, regular rate and rhythm  Abdomen: Soft, Non-tender, non-distended, positive bowel sounds  Neuro: No new focal deficits.   Extremities: No edema    Diagnostic Data:    Labs:  Recent Labs   Lab 24  0714 24  1623 24  0733 24  0855 24  1708 24  0758 24  1615 24  0806 24  1422 24  0034 24  0755 24  1558 24  0650 24  1153 24  1749 24  0027 24  0442   WBC 18.7*  --  17.8*  --   --  21.6*  --  26.3* 31.5*  --  38.6*  --  27.5* 31.9*  --   --  26.9*   HGB 8.3*  8.4*   < > 8.9*  8.9*  --    < > 9.2*  9.2*   < > 8.9*  8.9* 9.3*   < > 9.2*  8.8*   < > 7.3* 8.7* 8.0* 8.1* 7.8*  7.8*   MCV 87.3  --  89.7  --   --  89.7  --  89.2 89.4  --  92.6  --  93.5 90.5  --   --  91.4   .0*  --  520.0*  --   --  577.0*  --  536.0* 626.0*  --  698.0*  --  694.0* 550.0*  --   --  597.0*   BAND 1  --  2  --   --   --   --   --   --   --   --   --  4  --   --   --   --    INR 1.51*  --  >15.49* 1.33*  --  1.35*  --  1.34*  --   --  1.38*  --   --   --   --   --   --     < > = values in this interval not displayed.       Recent Labs   Lab 24  0758 24  0806 24  0755 24  0910      Social Determinants of Health     Financial Resource Strain: Low Risk  (10/11/2022)    Overall Financial Resource Strain (CARDIA)     Difficulty of Paying Living Expenses: Not hard at all   Food Insecurity: No Food Insecurity (2/10/2025)    Hunger Vital Sign     Worried About Running Out of Food in the Last Year: Never true     Ran Out of Food in the Last Year: Never true   Transportation Needs: No Transportation Needs (2/10/2025)    PRAPARE - Transportation     Lack of Transportation (Medical): No     Lack of Transportation (Non-Medical): No   Housing Stability: Low Risk  (2/10/2025)    Housing Stability Vital Sign     Unable to Pay for Housing in the Last Year: No     Number of Times Moved in the Last Year: 0     Homeless in the Last Year: No       Social History     Substance and Sexual Activity   Drug Use Never       No family history on file.   Review of Systems   Constitutional: Negative.    Respiratory: Negative.     Cardiovascular: Negative.    Hematological:  Does not bruise/bleed easily.          Objective   Physical Exam  Constitutional:       General: He is not in acute distress.     Appearance: Normal appearance. He is well-developed. He is not diaphoretic.   HENT:      Head: Normocephalic and atraumatic.      Mouth/Throat:      Mouth: Mucous membranes are moist.      Pharynx: Oropharynx is clear.   Eyes:      General: No scleral icterus.  Neck:      Thyroid: No thyromegaly.      Trachea: No tracheal deviation.   Cardiovascular:      Rate and Rhythm: Normal rate and regular rhythm.      Heart sounds: Normal heart sounds. No murmur heard.  Pulmonary:      Effort: Pulmonary effort is normal.      Breath sounds: Normal breath sounds. No wheezing or rales.   Musculoskeletal:      Cervical back: Normal range of motion and neck supple.      Right lower leg: No edema.      Left lower leg: No edema.   Lymphadenopathy:      Head:      Right side of head: No submental or submandibular adenopathy.      Left  06/05/24  0650   * 147* 149*  --  137*   BUN 6* 4* 7*  --  9   CREATSERUM 0.60 0.42* 0.54* 0.59 0.48*  0.48*   CA 8.3* 8.1* 8.3*  --  8.0*   ALB 1.4* 1.2* 1.3*  --   --     137 131*  --  136   K 3.7 3.7 4.1  --  3.7    107 101  --  105   CO2 21.0 20.0* 21.0  --  22.0   ALKPHO 381* 319* 295*  --   --    * 183* 94*  --   --    * 194* 156*  --   --    BILT 0.7 0.7 0.6  --   --    TP 6.1* 5.7* 6.2*  --   --        Estimated Creatinine Clearance: 91.7 mL/min (A) (based on SCr of 0.48 mg/dL (L)).    No results for input(s): \"TROP\", \"TROPHS\", \"CK\" in the last 168 hours.      Recent Labs   Lab 06/01/24  0758 06/02/24  0806 06/03/24  0755   PTP 16.7* 16.6* 17.0*   INR 1.35* 1.34* 1.38*          Microbiology    Hospital Encounter on 05/26/24   1. Body Fluid Cult Aerobic and Anaerobic     Status: Abnormal    Collection Time: 05/29/24  3:46 PM    Specimen: Pleural Fluid, Right; Body fluid, unspecified   Result Value Ref Range    Body Fluid Culture Result 3+ growth Streptococcus intermedius (A) N/A    Body Fluid Smear 4+ WBCs seen N/A    Body Fluid Smear No organisms seen N/A    Body Fluid Smear This is a cytocentrifuged smear. N/A   2. Blood Culture     Status: None    Collection Time: 05/29/24  2:24 PM    Specimen: Blood,peripheral   Result Value Ref Range    Blood Culture Result No Growth 5 Days N/A         Imaging: Reviewed in Epic.    Medications:    acetaminophen  1,000 mg Intravenous Q6H    heparin  5,000 Units Subcutaneous 2 times per day    lidocaine-menthol  1 patch Transdermal Daily    vancomycin  750 mg Intravenous Q12H    metoprolol tartrate  25 mg Oral 2x Daily(Beta Blocker)    ampicillin-sulbactam  3 g Intravenous q6h    [Transfer Hold] HYDROmorphone  0.2 mg Intravenous Once    oxybutynin ER  5 mg Oral Daily    [Held by provider] aspirin  81 mg Oral Daily    atorvastatin  80 mg Oral Nightly    busPIRone  20 mg Oral Daily    gabapentin  600 mg Oral Nightly    pantoprazole  40 mg  Oral QAM AC    QUEtiapine ER  200 mg Oral QPM    umeclidinium bromide  1 puff Inhalation Daily    insulin aspart  1-10 Units Subcutaneous TID AC and HS       Assessment & Plan:      # RLL pneumonia with empyema   # RLL effusion with loculation and chest pain   - sp chest tube by IR on 5/29  -abx adjusted per cultures   - sp MIST w/o improvement   - s/p VATS decortication today monitoring in ICU   - CT Chest reviewed  - Pulmonology and ID rec appreciated   - pain meds    # elevated liver enzymes seems ischemic in nature per course   -improved  - exam benign   - US abd with doppler neg   - avoid hepatotoxic agents    # Iron def Anemia  - sp PRBC   - no s/s bleeding     #Coagulopathy- repeat INR normalized     #COPD   -no active wheezing  -Continue inhalers  -prn nebs.     #Acute metabolic toxic ( anxiolytic) encephalopathy, resolved   -Ammonia normal   -CT brain negative  -ABG reviewed    #Hypertension-metoprolol on hold due to low BP     #DM 2-hyperglycemia protocol     #GERD/ Jamison's esophagus -PPI    #MDD/anxiety- Home med         Supplementary Documentation:     Quality:  DVT Mechanical Prophylaxis:   SCDs, Early ambuation  DVT Pharmacologic Prophylaxis   Medication    heparin (Porcine) 5000 UNIT/ML injection 5,000 Units         DVT Pharmacologic prophylaxis: Aspirin 81 mg      Code Status: Full Code  Gotti: Gotti catheter in place  Gotti Duration (in days):   Central line:    ILYA:   Discharge is dependent on: progress  At this point Ms. King is expected to be discharge to: home    The 21st Century Cures Act makes medical notes like these available to patients in the interest of transparency. Please be advised this is a medical document. Medical documents are intended to carry relevant information, facts as evident, and the clinical opinion of the practitioner. The medical note is intended as peer to peer communication and may appear blunt or direct. It is written in medical language and may contain  abbreviations or verbiage that are unfamiliar.

## 2025-02-28 NOTE — PROGRESS NOTES
[facility] PRE-SURGICAL TESTING INSTRUCTIONS                      PRIOR TO PROCEDURE DATE:    1. PLEASE FOLLOW ANY INSTRUCTIONS GIVEN TO YOU PER YOUR SURGEON.      2. Arrange for someone to drive you home and be with you for the first 24 hours after discharge for your safety after your procedure for which you received sedation. Ensure it is someone we can share information with regarding your discharge.     NOTE: At this time ONLY 2 ADULTS may accompany you   One person ENCOURAGED to stay at hospital entire time if outpatient surgery      3. You must contact your surgeon for instructions IF:  You are taking any blood thinners, aspirin, anti-inflammatory or vitamins.  There is a change in your physical condition such as a cold, fever, rash, cuts, sores, or any other infection, especially near your surgical site.    4. Do not drink alcohol the day before or day of your procedure.  Do not use any recreational marijuana at least 24 hours or street drugs (heroin, cocaine) at minimum 5 days prior to your procedure.     5. A Pre-Surgical History and Physical MUST be completed WITHIN 30 DAYS OR LESS prior to your procedure.by your Physician or an Urgent Care        THE DAY OF YOUR PROCEDURE:  1.  Follow instructions for ARRIVAL TIME as DIRECTED BY YOUR SURGEON.     2. Enter the MAIN entrance from [facility] and follow the signs to the free Parking Garage or  Parking (offered free of charge 7 am-5pm).      3. Enter the Main Entrance of the hospital (do not enter from the lower level of the parking garage). Upon entrance, check in with the  at the surgical information desk on your LEFT.   Bring your insurance card and photo ID to register      4. DO NOT EAT ANYTHING 8 hours prior to arrival for surgery.  You may have up to 8 ounces of water 4 hours prior to your arrival for surgery.   NOTE: ALL Gastric, Bariatric & Bowel surgery patients - you MUST follow your surgeon's instructions regarding  drowsiness, changes in your vital signs or breathing patterns. Nausea, headache, muscle aches, or sore throat may also occur after anesthesia.  Your nurse will help you manage these potential side effects.    2. For comfort and safety, arrange to have someone at home with you for the first 24 hours after discharge.    3. You and your family will be given written instructions about your diet, activity, dressing care, medications, and return visits.     4. Once at home, should issues with nausea, pain, or bleeding occur, or should you notice any signs of infection, you should call your surgeon.    5. Always clean your hands before and after caring for your wound. Do not let your family touch your surgery site without cleaning their hands.     6. Narcotic pain medications can cause significant constipation.  You may want to add a stool softener to your postoperative medication schedule or speak to your surgeon on how best to manage this SIDE EFFECT.    SPECIAL INSTRUCTIONS     Thank you for allowing us to care for you.  We strive to exceed your expectations in the delivery of care and service provided to you and your family.     If you need to contact the Pre-Admission Testing staff for any reason, please call us at [phone]    Instructions reviewed with patient during preadmission testing phone interview.  Snehal Mendosa RN.2/28/2025 .4:00 PM      ADDITIONAL EDUCATIONAL INFORMATION REVIEWED PER PHONE WITH YOU AND/OR YOUR FAMILY:  No Hibiclens® Bathing Instructions   Yes Antibacterial Soap

## 2025-03-04 ENCOUNTER — ANESTHESIA EVENT (OUTPATIENT)
Dept: OPERATING ROOM | Age: 75
End: 2025-03-04
Payer: MEDICARE

## 2025-03-05 ENCOUNTER — HOSPITAL ENCOUNTER (INPATIENT)
Age: 75
LOS: 3 days | Discharge: HOME | End: 2025-03-08
Attending: NEUROLOGICAL SURGERY | Admitting: NEUROLOGICAL SURGERY
Payer: MEDICARE

## 2025-03-05 ENCOUNTER — APPOINTMENT (OUTPATIENT)
Dept: GENERAL RADIOLOGY | Age: 75
End: 2025-03-05
Attending: NEUROLOGICAL SURGERY
Payer: MEDICARE

## 2025-03-05 ENCOUNTER — ANESTHESIA (OUTPATIENT)
Dept: OPERATING ROOM | Age: 75
End: 2025-03-05
Payer: MEDICARE

## 2025-03-05 DIAGNOSIS — Z98.1 S/P LUMBAR FUSION: Primary | ICD-10-CM

## 2025-03-05 PROBLEM — M48.062 LUMBAR STENOSIS WITH NEUROGENIC CLAUDICATION: Status: ACTIVE | Noted: 2025-03-05

## 2025-03-05 LAB
ABO/RH: NORMAL
ANTIBODY SCREEN: NORMAL
MRSA DNA SPEC QL NAA+PROBE: NORMAL

## 2025-03-05 PROCEDURE — 3700000001 HC ADD 15 MINUTES (ANESTHESIA): Performed by: NEUROLOGICAL SURGERY

## 2025-03-05 PROCEDURE — 2500000003 HC RX 250 WO HCPCS: Performed by: NEUROLOGICAL SURGERY

## 2025-03-05 PROCEDURE — 6360000002 HC RX W HCPCS: Performed by: NEUROLOGICAL SURGERY

## 2025-03-05 PROCEDURE — 0SG00A0 FUSION OF LUMBAR VERTEBRAL JOINT WITH INTERBODY FUSION DEVICE, ANTERIOR APPROACH, ANTERIOR COLUMN, OPEN APPROACH: ICD-10-PCS | Performed by: NEUROLOGICAL SURGERY

## 2025-03-05 PROCEDURE — 86850 RBC ANTIBODY SCREEN: CPT

## 2025-03-05 PROCEDURE — 7100000001 HC PACU RECOVERY - ADDTL 15 MIN: Performed by: NEUROLOGICAL SURGERY

## 2025-03-05 PROCEDURE — 6370000000 HC RX 637 (ALT 250 FOR IP): Performed by: NURSE PRACTITIONER

## 2025-03-05 PROCEDURE — 1200000000 HC SEMI PRIVATE

## 2025-03-05 PROCEDURE — C1821 INTERSPINOUS IMPLANT: HCPCS | Performed by: NEUROLOGICAL SURGERY

## 2025-03-05 PROCEDURE — 87641 MR-STAPH DNA AMP PROBE: CPT

## 2025-03-05 PROCEDURE — 8E0WXBF COMPUTER ASSISTED PROCEDURE OF TRUNK REGION, WITH FLUOROSCOPY: ICD-10-PCS | Performed by: NEUROLOGICAL SURGERY

## 2025-03-05 PROCEDURE — 72100 X-RAY EXAM L-S SPINE 2/3 VWS: CPT

## 2025-03-05 PROCEDURE — 3700000000 HC ANESTHESIA ATTENDED CARE: Performed by: NEUROLOGICAL SURGERY

## 2025-03-05 PROCEDURE — 6360000002 HC RX W HCPCS

## 2025-03-05 PROCEDURE — 7100000000 HC PACU RECOVERY - FIRST 15 MIN: Performed by: NEUROLOGICAL SURGERY

## 2025-03-05 PROCEDURE — 86900 BLOOD TYPING SEROLOGIC ABO: CPT

## 2025-03-05 PROCEDURE — 01NB0ZZ RELEASE LUMBAR NERVE, OPEN APPROACH: ICD-10-PCS | Performed by: NEUROLOGICAL SURGERY

## 2025-03-05 PROCEDURE — C1713 ANCHOR/SCREW BN/BN,TIS/BN: HCPCS | Performed by: NEUROLOGICAL SURGERY

## 2025-03-05 PROCEDURE — 0SG1071 FUSION OF 2 OR MORE LUMBAR VERTEBRAL JOINTS WITH AUTOLOGOUS TISSUE SUBSTITUTE, POSTERIOR APPROACH, POSTERIOR COLUMN, OPEN APPROACH: ICD-10-PCS | Performed by: NEUROLOGICAL SURGERY

## 2025-03-05 PROCEDURE — 0ST20ZZ RESECTION OF LUMBAR VERTEBRAL DISC, OPEN APPROACH: ICD-10-PCS | Performed by: NEUROLOGICAL SURGERY

## 2025-03-05 PROCEDURE — 2500000003 HC RX 250 WO HCPCS: Performed by: NURSE PRACTITIONER

## 2025-03-05 PROCEDURE — 3600000014 HC SURGERY LEVEL 4 ADDTL 15MIN: Performed by: NEUROLOGICAL SURGERY

## 2025-03-05 PROCEDURE — 6360000002 HC RX W HCPCS: Performed by: NURSE PRACTITIONER

## 2025-03-05 PROCEDURE — 6360000002 HC RX W HCPCS: Performed by: ANESTHESIOLOGY

## 2025-03-05 PROCEDURE — 2580000003 HC RX 258: Performed by: ANESTHESIOLOGY

## 2025-03-05 PROCEDURE — 2720000010 HC SURG SUPPLY STERILE: Performed by: NEUROLOGICAL SURGERY

## 2025-03-05 PROCEDURE — 4A10X4G MONITORING OF CENTRAL NERVOUS ELECTRICAL ACTIVITY, INTRAOPERATIVE, EXTERNAL APPROACH: ICD-10-PCS | Performed by: NEUROLOGICAL SURGERY

## 2025-03-05 PROCEDURE — 86901 BLOOD TYPING SEROLOGIC RH(D): CPT

## 2025-03-05 PROCEDURE — 3600000004 HC SURGERY LEVEL 4 BASE: Performed by: NEUROLOGICAL SURGERY

## 2025-03-05 PROCEDURE — 2580000003 HC RX 258

## 2025-03-05 PROCEDURE — 2709999900 HC NON-CHARGEABLE SUPPLY: Performed by: NEUROLOGICAL SURGERY

## 2025-03-05 PROCEDURE — 2500000003 HC RX 250 WO HCPCS

## 2025-03-05 PROCEDURE — 0SP004Z REMOVAL OF INTERNAL FIXATION DEVICE FROM LUMBAR VERTEBRAL JOINT, OPEN APPROACH: ICD-10-PCS | Performed by: NEUROLOGICAL SURGERY

## 2025-03-05 DEVICE — ROD SPNL LORDTC 5.5X70 MM TI RELINE-O: Type: IMPLANTABLE DEVICE | Site: SPINE LUMBAR | Status: FUNCTIONAL

## 2025-03-05 DEVICE — SCREW SPNL DIA5.5MM OPN TULIP LOK RELINE: Type: IMPLANTABLE DEVICE | Site: SPINE LUMBAR | Status: FUNCTIONAL

## 2025-03-05 DEVICE — IMPLANTABLE DEVICE: Type: IMPLANTABLE DEVICE | Site: SPINE LUMBAR | Status: FUNCTIONAL

## 2025-03-05 DEVICE — GRAFT HUM TISS L 10ML BONE MTRX CELLULAR OSTEOCEL PRO: Type: IMPLANTABLE DEVICE | Site: SPINE LUMBAR | Status: FUNCTIONAL

## 2025-03-05 DEVICE — SCREW SPNL L45MM DIA7.5MM POST THORACOLUMBOSACRAL POLYAX 2S: Type: IMPLANTABLE DEVICE | Site: SPINE LUMBAR | Status: FUNCTIONAL

## 2025-03-05 RX ORDER — FAMOTIDINE 20 MG/1
20 TABLET, FILM COATED ORAL 2 TIMES DAILY
Status: DISCONTINUED | OUTPATIENT
Start: 2025-03-05 | End: 2025-03-08 | Stop reason: HOSPADM

## 2025-03-05 RX ORDER — APREPITANT 40 MG/1
40 CAPSULE ORAL ONCE
Status: COMPLETED | OUTPATIENT
Start: 2025-03-05 | End: 2025-03-05

## 2025-03-05 RX ORDER — POLYETHYLENE GLYCOL 3350 17 G/17G
17 POWDER, FOR SOLUTION ORAL DAILY PRN
Status: DISCONTINUED | OUTPATIENT
Start: 2025-03-05 | End: 2025-03-06

## 2025-03-05 RX ORDER — HYDROMORPHONE HYDROCHLORIDE 1 MG/ML
0.5 INJECTION, SOLUTION INTRAMUSCULAR; INTRAVENOUS; SUBCUTANEOUS EVERY 5 MIN PRN
Status: DISCONTINUED | OUTPATIENT
Start: 2025-03-05 | End: 2025-03-05 | Stop reason: HOSPADM

## 2025-03-05 RX ORDER — ONDANSETRON 4 MG/1
4 TABLET, ORALLY DISINTEGRATING ORAL EVERY 8 HOURS PRN
Status: DISCONTINUED | OUTPATIENT
Start: 2025-03-05 | End: 2025-03-08 | Stop reason: HOSPADM

## 2025-03-05 RX ORDER — LORAZEPAM 2 MG/ML
0.5 INJECTION INTRAMUSCULAR
Status: DISCONTINUED | OUTPATIENT
Start: 2025-03-05 | End: 2025-03-05 | Stop reason: HOSPADM

## 2025-03-05 RX ORDER — BISACODYL 10 MG
10 SUPPOSITORY, RECTAL RECTAL DAILY PRN
Status: DISCONTINUED | OUTPATIENT
Start: 2025-03-05 | End: 2025-03-08 | Stop reason: HOSPADM

## 2025-03-05 RX ORDER — LIDOCAINE HYDROCHLORIDE 10 MG/ML
1 INJECTION, SOLUTION EPIDURAL; INFILTRATION; INTRACAUDAL; PERINEURAL
Status: DISCONTINUED | OUTPATIENT
Start: 2025-03-05 | End: 2025-03-05 | Stop reason: HOSPADM

## 2025-03-05 RX ORDER — SUCCINYLCHOLINE/SOD CL,ISO/PF 200MG/10ML
SYRINGE (ML) INTRAVENOUS
Status: DISCONTINUED | OUTPATIENT
Start: 2025-03-05 | End: 2025-03-05 | Stop reason: SDUPTHER

## 2025-03-05 RX ORDER — TAMSULOSIN HYDROCHLORIDE 0.4 MG/1
0.4 CAPSULE ORAL NIGHTLY
Status: DISCONTINUED | OUTPATIENT
Start: 2025-03-05 | End: 2025-03-08 | Stop reason: HOSPADM

## 2025-03-05 RX ORDER — FENTANYL CITRATE 50 UG/ML
25 INJECTION, SOLUTION INTRAMUSCULAR; INTRAVENOUS EVERY 5 MIN PRN
Status: DISCONTINUED | OUTPATIENT
Start: 2025-03-05 | End: 2025-03-05 | Stop reason: HOSPADM

## 2025-03-05 RX ORDER — REMIFENTANIL HYDROCHLORIDE 1 MG/ML
INJECTION, POWDER, LYOPHILIZED, FOR SOLUTION INTRAVENOUS
Status: DISCONTINUED | OUTPATIENT
Start: 2025-03-05 | End: 2025-03-05 | Stop reason: SDUPTHER

## 2025-03-05 RX ORDER — GLYCOPYRROLATE 0.2 MG/ML
INJECTION INTRAMUSCULAR; INTRAVENOUS
Status: DISCONTINUED | OUTPATIENT
Start: 2025-03-05 | End: 2025-03-05 | Stop reason: SDUPTHER

## 2025-03-05 RX ORDER — ROCURONIUM BROMIDE 10 MG/ML
INJECTION, SOLUTION INTRAVENOUS
Status: DISCONTINUED | OUTPATIENT
Start: 2025-03-05 | End: 2025-03-05 | Stop reason: SDUPTHER

## 2025-03-05 RX ORDER — SODIUM CHLORIDE 9 MG/ML
INJECTION, SOLUTION INTRAVENOUS PRN
Status: DISCONTINUED | OUTPATIENT
Start: 2025-03-05 | End: 2025-03-05 | Stop reason: HOSPADM

## 2025-03-05 RX ORDER — DIPHENHYDRAMINE HYDROCHLORIDE 50 MG/ML
12.5 INJECTION INTRAMUSCULAR; INTRAVENOUS
Status: DISCONTINUED | OUTPATIENT
Start: 2025-03-05 | End: 2025-03-05 | Stop reason: HOSPADM

## 2025-03-05 RX ORDER — IPRATROPIUM BROMIDE AND ALBUTEROL SULFATE 2.5; .5 MG/3ML; MG/3ML
1 SOLUTION RESPIRATORY (INHALATION)
Status: DISCONTINUED | OUTPATIENT
Start: 2025-03-05 | End: 2025-03-05 | Stop reason: HOSPADM

## 2025-03-05 RX ORDER — SODIUM CHLORIDE 0.9 % (FLUSH) 0.9 %
5-40 SYRINGE (ML) INJECTION EVERY 12 HOURS SCHEDULED
Status: DISCONTINUED | OUTPATIENT
Start: 2025-03-05 | End: 2025-03-08 | Stop reason: HOSPADM

## 2025-03-05 RX ORDER — PROCHLORPERAZINE EDISYLATE 5 MG/ML
5 INJECTION INTRAMUSCULAR; INTRAVENOUS
Status: DISCONTINUED | OUTPATIENT
Start: 2025-03-05 | End: 2025-03-05 | Stop reason: HOSPADM

## 2025-03-05 RX ORDER — SODIUM CHLORIDE 0.9 % (FLUSH) 0.9 %
5-40 SYRINGE (ML) INJECTION PRN
Status: DISCONTINUED | OUTPATIENT
Start: 2025-03-05 | End: 2025-03-08 | Stop reason: HOSPADM

## 2025-03-05 RX ORDER — OXYCODONE HYDROCHLORIDE 5 MG/1
5 TABLET ORAL EVERY 4 HOURS PRN
Status: DISCONTINUED | OUTPATIENT
Start: 2025-03-05 | End: 2025-03-08 | Stop reason: HOSPADM

## 2025-03-05 RX ORDER — PROPOFOL 10 MG/ML
INJECTION, EMULSION INTRAVENOUS
Status: DISCONTINUED | OUTPATIENT
Start: 2025-03-05 | End: 2025-03-05 | Stop reason: SDUPTHER

## 2025-03-05 RX ORDER — MORPHINE SULFATE 2 MG/ML
2 INJECTION, SOLUTION INTRAMUSCULAR; INTRAVENOUS
Status: DISCONTINUED | OUTPATIENT
Start: 2025-03-05 | End: 2025-03-07

## 2025-03-05 RX ORDER — DIPHENHYDRAMINE HYDROCHLORIDE 50 MG/ML
25 INJECTION INTRAMUSCULAR; INTRAVENOUS EVERY 6 HOURS PRN
Status: DISCONTINUED | OUTPATIENT
Start: 2025-03-05 | End: 2025-03-08 | Stop reason: HOSPADM

## 2025-03-05 RX ORDER — SODIUM CHLORIDE 9 MG/ML
INJECTION, SOLUTION INTRAVENOUS PRN
Status: DISCONTINUED | OUTPATIENT
Start: 2025-03-05 | End: 2025-03-08 | Stop reason: HOSPADM

## 2025-03-05 RX ORDER — METHOCARBAMOL 750 MG/1
750 TABLET, FILM COATED ORAL EVERY 8 HOURS PRN
Status: DISCONTINUED | OUTPATIENT
Start: 2025-03-05 | End: 2025-03-08 | Stop reason: HOSPADM

## 2025-03-05 RX ORDER — DEXAMETHASONE SODIUM PHOSPHATE 4 MG/ML
INJECTION, SOLUTION INTRA-ARTICULAR; INTRALESIONAL; INTRAMUSCULAR; INTRAVENOUS; SOFT TISSUE
Status: DISCONTINUED | OUTPATIENT
Start: 2025-03-05 | End: 2025-03-05 | Stop reason: SDUPTHER

## 2025-03-05 RX ORDER — VANCOMYCIN HYDROCHLORIDE 1 G/20ML
INJECTION, POWDER, LYOPHILIZED, FOR SOLUTION INTRAVENOUS PRN
Status: DISCONTINUED | OUTPATIENT
Start: 2025-03-05 | End: 2025-03-05 | Stop reason: HOSPADM

## 2025-03-05 RX ORDER — DIPHENHYDRAMINE HCL 25 MG
25 TABLET ORAL EVERY 6 HOURS PRN
Status: DISCONTINUED | OUTPATIENT
Start: 2025-03-05 | End: 2025-03-08 | Stop reason: HOSPADM

## 2025-03-05 RX ORDER — METHOCARBAMOL 750 MG/1
750 TABLET, FILM COATED ORAL EVERY 8 HOURS PRN
Status: DISCONTINUED | OUTPATIENT
Start: 2025-03-05 | End: 2025-03-05

## 2025-03-05 RX ORDER — FENTANYL CITRATE 50 UG/ML
INJECTION, SOLUTION INTRAMUSCULAR; INTRAVENOUS
Status: DISCONTINUED | OUTPATIENT
Start: 2025-03-05 | End: 2025-03-05 | Stop reason: SDUPTHER

## 2025-03-05 RX ORDER — PHENYLEPHRINE HYDROCHLORIDE 10 MG/ML
INJECTION INTRAVENOUS
Status: DISCONTINUED | OUTPATIENT
Start: 2025-03-05 | End: 2025-03-05 | Stop reason: SDUPTHER

## 2025-03-05 RX ORDER — SODIUM CHLORIDE 0.9 % (FLUSH) 0.9 %
5-40 SYRINGE (ML) INJECTION EVERY 12 HOURS SCHEDULED
Status: DISCONTINUED | OUTPATIENT
Start: 2025-03-05 | End: 2025-03-05 | Stop reason: HOSPADM

## 2025-03-05 RX ORDER — APREPITANT 40 MG/1
CAPSULE ORAL
Status: DISPENSED
Start: 2025-03-05 | End: 2025-03-05

## 2025-03-05 RX ORDER — SODIUM CHLORIDE, SODIUM LACTATE, POTASSIUM CHLORIDE, CALCIUM CHLORIDE 600; 310; 30; 20 MG/100ML; MG/100ML; MG/100ML; MG/100ML
INJECTION, SOLUTION INTRAVENOUS CONTINUOUS
Status: DISCONTINUED | OUTPATIENT
Start: 2025-03-05 | End: 2025-03-05 | Stop reason: HOSPADM

## 2025-03-05 RX ORDER — MORPHINE SULFATE 4 MG/ML
4 INJECTION INTRAVENOUS
Status: DISCONTINUED | OUTPATIENT
Start: 2025-03-05 | End: 2025-03-07

## 2025-03-05 RX ORDER — ONDANSETRON 2 MG/ML
4 INJECTION INTRAMUSCULAR; INTRAVENOUS
Status: DISCONTINUED | OUTPATIENT
Start: 2025-03-05 | End: 2025-03-05 | Stop reason: HOSPADM

## 2025-03-05 RX ORDER — LIDOCAINE HYDROCHLORIDE 20 MG/ML
INJECTION, SOLUTION INTRAVENOUS
Status: DISCONTINUED | OUTPATIENT
Start: 2025-03-05 | End: 2025-03-05 | Stop reason: SDUPTHER

## 2025-03-05 RX ORDER — SODIUM CHLORIDE, SODIUM LACTATE, POTASSIUM CHLORIDE, CALCIUM CHLORIDE 600; 310; 30; 20 MG/100ML; MG/100ML; MG/100ML; MG/100ML
INJECTION, SOLUTION INTRAVENOUS
Status: DISCONTINUED | OUTPATIENT
Start: 2025-03-05 | End: 2025-03-05 | Stop reason: SDUPTHER

## 2025-03-05 RX ORDER — SODIUM CHLORIDE 9 MG/ML
INJECTION, SOLUTION INTRAVENOUS CONTINUOUS
Status: DISCONTINUED | OUTPATIENT
Start: 2025-03-05 | End: 2025-03-08 | Stop reason: HOSPADM

## 2025-03-05 RX ORDER — ATORVASTATIN CALCIUM 20 MG/1
20 TABLET, FILM COATED ORAL DAILY
Status: DISCONTINUED | OUTPATIENT
Start: 2025-03-05 | End: 2025-03-08 | Stop reason: HOSPADM

## 2025-03-05 RX ORDER — LABETALOL HYDROCHLORIDE 5 MG/ML
10 INJECTION, SOLUTION INTRAVENOUS
Status: DISCONTINUED | OUTPATIENT
Start: 2025-03-05 | End: 2025-03-05 | Stop reason: HOSPADM

## 2025-03-05 RX ORDER — OXYCODONE HYDROCHLORIDE 5 MG/1
10 TABLET ORAL EVERY 4 HOURS PRN
Status: DISCONTINUED | OUTPATIENT
Start: 2025-03-05 | End: 2025-03-08 | Stop reason: HOSPADM

## 2025-03-05 RX ORDER — LIDOCAINE HYDROCHLORIDE AND EPINEPHRINE 10; 10 MG/ML; UG/ML
INJECTION, SOLUTION INFILTRATION; PERINEURAL PRN
Status: DISCONTINUED | OUTPATIENT
Start: 2025-03-05 | End: 2025-03-05 | Stop reason: HOSPADM

## 2025-03-05 RX ORDER — ENOXAPARIN SODIUM 100 MG/ML
40 INJECTION SUBCUTANEOUS DAILY
Status: DISCONTINUED | OUTPATIENT
Start: 2025-03-06 | End: 2025-03-08 | Stop reason: HOSPADM

## 2025-03-05 RX ORDER — SODIUM CHLORIDE 0.9 % (FLUSH) 0.9 %
5-40 SYRINGE (ML) INJECTION PRN
Status: DISCONTINUED | OUTPATIENT
Start: 2025-03-05 | End: 2025-03-05 | Stop reason: HOSPADM

## 2025-03-05 RX ORDER — GABAPENTIN 300 MG/1
300 CAPSULE ORAL 3 TIMES DAILY PRN
Status: DISCONTINUED | OUTPATIENT
Start: 2025-03-05 | End: 2025-03-08 | Stop reason: HOSPADM

## 2025-03-05 RX ORDER — ACETAMINOPHEN 325 MG/1
650 TABLET ORAL EVERY 6 HOURS
Status: DISCONTINUED | OUTPATIENT
Start: 2025-03-05 | End: 2025-03-08 | Stop reason: HOSPADM

## 2025-03-05 RX ORDER — ROCURONIUM BROMIDE 10 MG/ML
INJECTION, SOLUTION INTRAVENOUS
Status: DISCONTINUED | OUTPATIENT
Start: 2025-03-05 | End: 2025-03-05

## 2025-03-05 RX ORDER — METHOCARBAMOL 100 MG/ML
1000 INJECTION, SOLUTION INTRAMUSCULAR; INTRAVENOUS EVERY 8 HOURS PRN
Status: DISCONTINUED | OUTPATIENT
Start: 2025-03-05 | End: 2025-03-08 | Stop reason: HOSPADM

## 2025-03-05 RX ORDER — NALOXONE HYDROCHLORIDE 0.4 MG/ML
INJECTION, SOLUTION INTRAMUSCULAR; INTRAVENOUS; SUBCUTANEOUS PRN
Status: DISCONTINUED | OUTPATIENT
Start: 2025-03-05 | End: 2025-03-05 | Stop reason: HOSPADM

## 2025-03-05 RX ORDER — OXYCODONE HYDROCHLORIDE 5 MG/1
5 TABLET ORAL
Status: DISCONTINUED | OUTPATIENT
Start: 2025-03-05 | End: 2025-03-05 | Stop reason: HOSPADM

## 2025-03-05 RX ORDER — HYDROMORPHONE HYDROCHLORIDE 2 MG/ML
INJECTION, SOLUTION INTRAMUSCULAR; INTRAVENOUS; SUBCUTANEOUS
Status: DISCONTINUED | OUTPATIENT
Start: 2025-03-05 | End: 2025-03-05 | Stop reason: SDUPTHER

## 2025-03-05 RX ORDER — ONDANSETRON 2 MG/ML
INJECTION INTRAMUSCULAR; INTRAVENOUS
Status: DISCONTINUED | OUTPATIENT
Start: 2025-03-05 | End: 2025-03-05 | Stop reason: SDUPTHER

## 2025-03-05 RX ORDER — ONDANSETRON 2 MG/ML
4 INJECTION INTRAMUSCULAR; INTRAVENOUS EVERY 6 HOURS PRN
Status: DISCONTINUED | OUTPATIENT
Start: 2025-03-05 | End: 2025-03-08 | Stop reason: HOSPADM

## 2025-03-05 RX ORDER — MEPERIDINE HYDROCHLORIDE 25 MG/ML
12.5 INJECTION INTRAMUSCULAR; INTRAVENOUS; SUBCUTANEOUS EVERY 5 MIN PRN
Status: DISCONTINUED | OUTPATIENT
Start: 2025-03-05 | End: 2025-03-05 | Stop reason: HOSPADM

## 2025-03-05 RX ORDER — METHOCARBAMOL 100 MG/ML
INJECTION, SOLUTION INTRAMUSCULAR; INTRAVENOUS
Status: DISCONTINUED | OUTPATIENT
Start: 2025-03-05 | End: 2025-03-05 | Stop reason: SDUPTHER

## 2025-03-05 RX ORDER — SENNA AND DOCUSATE SODIUM 50; 8.6 MG/1; MG/1
1 TABLET, FILM COATED ORAL 2 TIMES DAILY
Status: DISCONTINUED | OUTPATIENT
Start: 2025-03-05 | End: 2025-03-08 | Stop reason: HOSPADM

## 2025-03-05 RX ORDER — MAGNESIUM HYDROXIDE 1200 MG/15ML
LIQUID ORAL CONTINUOUS PRN
Status: DISCONTINUED | OUTPATIENT
Start: 2025-03-05 | End: 2025-03-05 | Stop reason: HOSPADM

## 2025-03-05 RX ADMIN — PHENYLEPHRINE HYDROCHLORIDE 100 MCG: 10 INJECTION, SOLUTION INTRAVENOUS at 07:43

## 2025-03-05 RX ADMIN — ONDANSETRON 4 MG: 2 INJECTION INTRAMUSCULAR; INTRAVENOUS at 10:17

## 2025-03-05 RX ADMIN — DEXAMETHASONE SODIUM PHOSPHATE 8 MG: 4 INJECTION INTRA-ARTICULAR; INTRALESIONAL; INTRAMUSCULAR; INTRAVENOUS; SOFT TISSUE at 07:40

## 2025-03-05 RX ADMIN — METHOCARBAMOL 250 MG: 100 INJECTION, SOLUTION INTRAMUSCULAR; INTRAVENOUS at 08:29

## 2025-03-05 RX ADMIN — ACETAMINOPHEN 650 MG: 325 TABLET, FILM COATED ORAL at 21:09

## 2025-03-05 RX ADMIN — LIDOCAINE HYDROCHLORIDE 60 MG: 20 INJECTION, SOLUTION INTRAVENOUS at 07:33

## 2025-03-05 RX ADMIN — PROPOFOL 20 MG: 10 INJECTION, EMULSION INTRAVENOUS at 08:42

## 2025-03-05 RX ADMIN — FENTANYL CITRATE 50 MCG: 50 INJECTION, SOLUTION INTRAMUSCULAR; INTRAVENOUS at 07:33

## 2025-03-05 RX ADMIN — METHOCARBAMOL 250 MG: 100 INJECTION, SOLUTION INTRAMUSCULAR; INTRAVENOUS at 09:44

## 2025-03-05 RX ADMIN — REMIFENTANIL HYDROCHLORIDE 0.15 MCG/KG/MIN: 1 INJECTION, POWDER, LYOPHILIZED, FOR SOLUTION INTRAVENOUS at 07:34

## 2025-03-05 RX ADMIN — FAMOTIDINE 20 MG: 20 TABLET, FILM COATED ORAL at 15:31

## 2025-03-05 RX ADMIN — PROPOFOL 50 MG: 10 INJECTION, EMULSION INTRAVENOUS at 07:52

## 2025-03-05 RX ADMIN — LIDOCAINE HYDROCHLORIDE 40 MG: 20 INJECTION, SOLUTION INTRAVENOUS at 10:17

## 2025-03-05 RX ADMIN — OXYCODONE 5 MG: 5 TABLET ORAL at 15:31

## 2025-03-05 RX ADMIN — WATER 2000 MG: 1 INJECTION INTRAMUSCULAR; INTRAVENOUS; SUBCUTANEOUS at 15:29

## 2025-03-05 RX ADMIN — PHENYLEPHRINE HYDROCHLORIDE 10 MCG/MIN: 10 INJECTION, SOLUTION INTRAVENOUS at 07:39

## 2025-03-05 RX ADMIN — SODIUM CHLORIDE, SODIUM LACTATE, POTASSIUM CHLORIDE, AND CALCIUM CHLORIDE: .6; .31; .03; .02 INJECTION, SOLUTION INTRAVENOUS at 07:08

## 2025-03-05 RX ADMIN — SODIUM CHLORIDE, PRESERVATIVE FREE 10 ML: 5 INJECTION INTRAVENOUS at 21:10

## 2025-03-05 RX ADMIN — PROPOFOL 150 MG: 10 INJECTION, EMULSION INTRAVENOUS at 07:34

## 2025-03-05 RX ADMIN — PHENYLEPHRINE HYDROCHLORIDE 100 MCG: 10 INJECTION, SOLUTION INTRAVENOUS at 10:13

## 2025-03-05 RX ADMIN — PHENYLEPHRINE HYDROCHLORIDE 100 MCG: 10 INJECTION, SOLUTION INTRAVENOUS at 07:41

## 2025-03-05 RX ADMIN — PHENYLEPHRINE HYDROCHLORIDE 100 MCG: 10 INJECTION, SOLUTION INTRAVENOUS at 10:20

## 2025-03-05 RX ADMIN — TAMSULOSIN HYDROCHLORIDE 0.4 MG: 0.4 CAPSULE ORAL at 21:09

## 2025-03-05 RX ADMIN — ACETAMINOPHEN 650 MG: 325 TABLET, FILM COATED ORAL at 15:30

## 2025-03-05 RX ADMIN — PHENYLEPHRINE HYDROCHLORIDE 100 MCG: 10 INJECTION, SOLUTION INTRAVENOUS at 10:11

## 2025-03-05 RX ADMIN — FAMOTIDINE 20 MG: 20 TABLET, FILM COATED ORAL at 21:09

## 2025-03-05 RX ADMIN — WATER 2000 MG: 1 INJECTION INTRAMUSCULAR; INTRAVENOUS; SUBCUTANEOUS at 07:42

## 2025-03-05 RX ADMIN — FENTANYL CITRATE 50 MCG: 50 INJECTION, SOLUTION INTRAMUSCULAR; INTRAVENOUS at 07:52

## 2025-03-05 RX ADMIN — ONDANSETRON 4 MG: 2 INJECTION INTRAMUSCULAR; INTRAVENOUS at 14:50

## 2025-03-05 RX ADMIN — METHOCARBAMOL 250 MG: 100 INJECTION, SOLUTION INTRAMUSCULAR; INTRAVENOUS at 09:20

## 2025-03-05 RX ADMIN — ATORVASTATIN CALCIUM 20 MG: 20 TABLET, FILM COATED ORAL at 15:30

## 2025-03-05 RX ADMIN — Medication 160 MG: at 07:34

## 2025-03-05 RX ADMIN — ROCURONIUM BROMIDE 50 MG: 10 INJECTION, SOLUTION INTRAVENOUS at 08:50

## 2025-03-05 RX ADMIN — PHENYLEPHRINE HYDROCHLORIDE 50 MCG: 10 INJECTION, SOLUTION INTRAVENOUS at 09:26

## 2025-03-05 RX ADMIN — FENTANYL CITRATE 25 MCG: 50 INJECTION INTRAMUSCULAR; INTRAVENOUS at 11:24

## 2025-03-05 RX ADMIN — PROPOFOL 150 MCG/KG/MIN: 10 INJECTION, EMULSION INTRAVENOUS at 07:34

## 2025-03-05 RX ADMIN — WATER 2000 MG: 1 INJECTION INTRAMUSCULAR; INTRAVENOUS; SUBCUTANEOUS at 22:53

## 2025-03-05 RX ADMIN — PHENYLEPHRINE HYDROCHLORIDE 50 MCG: 10 INJECTION, SOLUTION INTRAVENOUS at 09:55

## 2025-03-05 RX ADMIN — SODIUM CHLORIDE, SODIUM LACTATE, POTASSIUM CHLORIDE, AND CALCIUM CHLORIDE: .6; .31; .03; .02 INJECTION, SOLUTION INTRAVENOUS at 07:09

## 2025-03-05 RX ADMIN — PHENYLEPHRINE HYDROCHLORIDE 100 MCG: 10 INJECTION, SOLUTION INTRAVENOUS at 10:14

## 2025-03-05 RX ADMIN — APREPITANT 40 MG: 40 CAPSULE ORAL at 07:21

## 2025-03-05 RX ADMIN — HYDROMORPHONE HYDROCHLORIDE 1 MG: 2 INJECTION, SOLUTION INTRAMUSCULAR; INTRAVENOUS; SUBCUTANEOUS at 10:32

## 2025-03-05 RX ADMIN — FENTANYL CITRATE 25 MCG: 50 INJECTION INTRAMUSCULAR; INTRAVENOUS at 12:29

## 2025-03-05 RX ADMIN — SUGAMMADEX 200 MG: 100 INJECTION, SOLUTION INTRAVENOUS at 09:30

## 2025-03-05 RX ADMIN — PROPOFOL 20 MG: 10 INJECTION, EMULSION INTRAVENOUS at 08:05

## 2025-03-05 RX ADMIN — PHENYLEPHRINE HYDROCHLORIDE 100 MCG: 10 INJECTION, SOLUTION INTRAVENOUS at 09:41

## 2025-03-05 RX ADMIN — GLYCOPYRROLATE 0.2 MG: 0.2 INJECTION INTRAMUSCULAR; INTRAVENOUS at 08:15

## 2025-03-05 RX ADMIN — SENNOSIDES AND DOCUSATE SODIUM 1 TABLET: 50; 8.6 TABLET ORAL at 21:09

## 2025-03-05 RX ADMIN — SODIUM CHLORIDE, SODIUM LACTATE, POTASSIUM CHLORIDE, AND CALCIUM CHLORIDE: .6; .31; .03; .02 INJECTION, SOLUTION INTRAVENOUS at 07:30

## 2025-03-05 RX ADMIN — METHOCARBAMOL 250 MG: 100 INJECTION, SOLUTION INTRAMUSCULAR; INTRAVENOUS at 09:59

## 2025-03-05 RX ADMIN — PHENYLEPHRINE HYDROCHLORIDE 100 MCG: 10 INJECTION, SOLUTION INTRAVENOUS at 10:23

## 2025-03-05 RX ADMIN — ROCURONIUM BROMIDE 20 MG: 10 INJECTION, SOLUTION INTRAVENOUS at 08:59

## 2025-03-05 ASSESSMENT — PAIN - FUNCTIONAL ASSESSMENT
PAIN_FUNCTIONAL_ASSESSMENT: PREVENTS OR INTERFERES SOME ACTIVE ACTIVITIES AND ADLS
PAIN_FUNCTIONAL_ASSESSMENT: PREVENTS OR INTERFERES SOME ACTIVE ACTIVITIES AND ADLS

## 2025-03-05 ASSESSMENT — PAIN DESCRIPTION - DESCRIPTORS
DESCRIPTORS: SHARP
DESCRIPTORS: ACHING
DESCRIPTORS: ACHING

## 2025-03-05 ASSESSMENT — PAIN DESCRIPTION - LOCATION
LOCATION: BACK

## 2025-03-05 ASSESSMENT — PAIN SCALES - GENERAL
PAINLEVEL_OUTOF10: 5
PAINLEVEL_OUTOF10: 0
PAINLEVEL_OUTOF10: 6
PAINLEVEL_OUTOF10: 4
PAINLEVEL_OUTOF10: 4
PAINLEVEL_OUTOF10: 5
PAINLEVEL_OUTOF10: 3
PAINLEVEL_OUTOF10: 0
PAINLEVEL_OUTOF10: 4

## 2025-03-05 ASSESSMENT — PAIN DESCRIPTION - ORIENTATION
ORIENTATION: POSTERIOR
ORIENTATION: MID
ORIENTATION: MID;LOWER
ORIENTATION: POSTERIOR

## 2025-03-05 ASSESSMENT — PAIN DESCRIPTION - FREQUENCY: FREQUENCY: CONTINUOUS

## 2025-03-05 ASSESSMENT — PAIN DESCRIPTION - PAIN TYPE: TYPE: SURGICAL PAIN

## 2025-03-05 NOTE — PROGRESS NOTES
Pt arrived from OR, s/p LEFT LUMBAR 3 LUMBAR 4 DIRECT LATERAL INTERBODY FUSION WITH POSTERIOR LUMBAR 3 LUMBAR 4 DECOMPRESSION FUSION FIXATION ABOVE PRIOR FUSION - Left , report received from CRNA and OR RN, pt sedated on arrival, oral airway in place.  Pt had Exparel, band on arm

## 2025-03-05 NOTE — PROGRESS NOTES
Updated the wife, aware the patient has a ready room, RN to get report.  Pt spoke to wife, pt much more aware than previously.  Positioned on right side.  Small amount of drainage on ottom part of back incision, tegaderm and guaze placed,

## 2025-03-05 NOTE — ANESTHESIA PRE PROCEDURE
Department of Anesthesiology  Preprocedure Note       Name:  Brendon Bender   Age:  74 y.o.  :  1950                                          MRN:  [id number]         Date:  3/5/2025      Surgeon: Surgeon(s):  Javon House MD    Procedure: Procedure(s):  LEFT LUMBAR 3 LUMBAR 4 DIRECT LATERAL INTERBODY FUSION WITH POSTERIOR LUMBAR 3 LUMBAR 4 DECOMPRESSION FUSION FIXATION ABOVE PRIOR FUSION  .    Medications prior to admission:   Prior to Admission medications    Medication Sig Start Date End Date Taking? Authorizing Provider   Multiple Vitamins-Minerals (MULTIVITAMIN & MINERAL PO) Take 1 tablet by mouth daily   Yes ProviderMerari MD   tamsulosin (FLOMAX) 0.4 MG capsule TAKE 1 CAPSULE BY MOUTH DAILY  Patient taking differently: Take 1 capsule by mouth nightly 2/10/25  Yes Emiliano Higgins MD   celecoxib (CELEBREX) 200 MG capsule Take 1 capsule by mouth 2 times daily 24  Yes Emiliano Higgins MD   gabapentin (NEURONTIN) 300 MG capsule Take 1 capsule by mouth 3 times daily for 30 days.  Patient taking differently: Take 1 capsule by mouth 3 times daily as needed (pain in back). 24  Emiliano Higgins MD   atorvastatin (LIPITOR) 20 MG tablet Take 1 tablet by mouth daily 24   Emiliano Higgins MD       Current medications:    Current Facility-Administered Medications   Medication Dose Route Frequency Provider Last Rate Last Admin   • lidocaine PF 1 % injection 1 mL  1 mL IntraDERmal Once PRN Greg Shepherd MD       • lactated ringers infusion   IntraVENous Continuous Greg Shepherd  mL/hr at 25 0709 New Bag at 25 0709   • sodium chloride flush 0.9 % injection 5-40 mL  5-40 mL IntraVENous 2 times per day Greg Shepherd MD       • sodium chloride flush 0.9 % injection 5-40 mL  5-40 mL IntraVENous PRN Greg Shepherd MD       • 0.9 % sodium chloride infusion   IntraVENous PRN Greg Shepherd MD       • ceFAZolin (ANCEF) 2,000 mg in sterile water 20

## 2025-03-05 NOTE — BRIEF OP NOTE
Brief Postoperative Note      Patient: Brendon Bender  YOB: 1950  MRN: [id number]    Date of Procedure: 3/5/2025    Pre-Op Diagnosis Codes:      * Spinal stenosis of lumbar region with neurogenic claudication [M48.062]    Post-Op Diagnosis: Same and Post-Op Diagnosis Codes:     * Spinal stenosis of lumbar region with neurogenic claudication [M48.062]       Procedure(s):  LEFT LUMBAR 3 LUMBAR 4 DIRECT LATERAL INTERBODY FUSION WITH POSTERIOR LUMBAR 3 LUMBAR 4 DECOMPRESSION FUSION FIXATION ABOVE PRIOR FUSION  .    Surgeon(s):  Javon House MD    Assistant:  * No surgical staff found *    Anesthesia: General    Estimated Blood Loss (mL): less than 100     Complications: None    Specimens:   * No specimens in log *    Implants:  Implant Name Type Inv. Item Serial No.  Lot No. LRB No. Used Action   GRAFT HUM TISS L 10ML BONE MTRX CELLULAR OSTEOCEL PRO - [id number]  GRAFT HUM TISS L 10ML BONE MTRX CELLULAR OSTEOCEL PRO [id number] [employer]  Left 1 Implanted         Drains:   Closed/Suction Drain Inferior;Medial Back Other (Comment) (Active)   Site Description Clean, dry & intact 03/05/25 1001   Dressing Status Clean, dry & intact;Other (comment) 03/05/25 1001       Urinary Catheter 03/05/25 2 Way (Active)       Findings:  Infection Present At Time Of Surgery (PATOS) (choose all levels that have infection present):  No infection present  Other Findings: Stenosis    Electronically signed by Javon House MD on 3/5/2025 at 10:02 AM

## 2025-03-05 NOTE — ANESTHESIA POSTPROCEDURE EVALUATION
Department of Anesthesiology  Postprocedure Note    Patient: Brendon Bender  MRN: [id number]  YOB: 1950  Date of evaluation: 3/5/2025    Procedure Summary       Date: 03/05/25 Room / Location: [address]    Anesthesia Start: 0729 Anesthesia Stop: 1032    Procedures:       LEFT LUMBAR 3 LUMBAR 4 DIRECT LATERAL INTERBODY FUSION WITH POSTERIOR LUMBAR 3 LUMBAR 4 DECOMPRESSION FUSION FIXATION ABOVE PRIOR FUSION (Left: Spine Lumbar)      . (Spine Lumbar) Diagnosis:       Spinal stenosis of lumbar region with neurogenic claudication      (Spinal stenosis of lumbar region with neurogenic claudication [M48.062])    Surgeons: Javon House MD Responsible Provider: Octavio Rich MD    Anesthesia Type: general ASA Status: 2            Anesthesia Type: No value filed.    Gaston Phase I: Gaston Score: 4    Gaston Phase II:      Anesthesia Post Evaluation    Patient location during evaluation: PACU  Patient participation: complete - patient participated  Level of consciousness: awake  Airway patency: patent  Nausea & Vomiting: no nausea and no vomiting  Cardiovascular status: blood pressure returned to baseline and hemodynamically stable  Respiratory status: acceptable  Hydration status: euvolemic  Multimodal analgesia pain management approach  Pain management: adequate    No notable events documented.

## 2025-03-05 NOTE — PROGRESS NOTES
PACU Transfer Note    Vitals:    03/05/25 1345   BP: (!) 134/90   Pulse: 75   Resp: 12   Temp: 97.7 °F (36.5 °C)   SpO2: 97%       In: 2200 [I.V.:2200]  Out: 615 [Urine:425; Drains:90]    Pain assessment:  BP in range, taken to room per RN and transport, belongings taken to room  Pain Level: 4    Report given to Receiving unit RN.    3/5/2025 1:57 PM

## 2025-03-05 NOTE — PROGRESS NOTES
4 Eyes Skin Assessment     NAME:  Brendon Bender  YOB: 1950  MEDICAL RECORD NUMBER:  [id number]    The patient is being assessed for  Admission    I agree that at least one RN has performed a thorough Head to Toe Skin Assessment on the patient. ALL assessment sites listed below have been assessed.      Areas assessed by both nurses:    Head, Face, Ears, Shoulders, Back, Chest, Arms, Elbows, Hands, Sacrum. Buttock, Coccyx, Ischium, Legs. Feet and Heels, and Under Medical Devices         Does the Patient have a Wound? No noted wound(s)       Jaciel Prevention initiated by RN: No  Wound Care Orders initiated by RN: No    Pressure Injury (Stage 3,4, Unstageable, DTI, NWPT, and Complex wounds) if present, place Wound referral order by RN under : No    New Ostomies, if present place, Ostomy referral order under : No     Nurse 1 eSignature: Electronically signed by Coral Willingham RN on 3/5/25 at 4:21 PM EST    **SHARE this note so that the co-signing nurse can place an eSignature**    Nurse 2 eSignature: {Esignature:[id number]}

## 2025-03-05 NOTE — PROGRESS NOTES
Daughter came in to see the pt, then went to work.  Pt's wife went home, will call when room is ready, explained to the daughter that is will be several hours until room is available

## 2025-03-05 NOTE — PROGRESS NOTES
Patient admitted to room 5518. Report received from PACU RN VSS on room air Patient oriented to room and call light. Rights and responsibilities provided to patient, and welcome packet provided to patient. 4 eyes skin assessment completed with 2nd RN.

## 2025-03-05 NOTE — PROGRESS NOTES
Patient is A&Ox4. VSS this shift. Pt has been sleeping since he came up to this unit. Pt states that he is \" just very tired\" Patient has endorsed pain that has been managed per MAR and non-pharm measures. Patient is tolerating PO diet. Pt has not ambulated this shift. Voiding adequately via camejo catheter. Patient updated on plan of care. Fall and safety precautions in place, call light within reach.

## 2025-03-05 NOTE — OP NOTE
Operative Report    PATIENT NAME: Brendon Bender  YOB: 1950  MEDICAL RECORD# [id number]  SURGERY DATE: 3/5/2025  SURGEON:  Javon House MD  ASSISTANT:  RUBEN Velázquez        PREOPERATIVE DIAGNOSIS:  1.  Degenerative lumbar stenosis  2.  Severe lumbar stenosis at L3-4 above prior L4-L5 fusion  3.  Foraminal stenosis associated with intractable radiculopathy and back pain  4.  Neurogenic Claudication     POSTOPERATIVE DIAGNOSIS:  1.   Same     PROCEDURES PERFORMED:     Lateral      1.  Left-sided lateral retroperitoneal approach to the L3-4 interspace for discectomy.  2.  DLIF with Nuvasive Titanium interbody cages packed with allograft into the L3-4 interspace  3.  EVOKES  4.  Fluoroscopy     Posterior      1. Lumbar Posterior Approach - re-exploration  2. Placement of Nuvasive pedicle screws L3-4-5 with removal of prior L4-5- caps and rods - removal of screws  3. L3-4 decompression with L3-4 laminectomy, total bilateral facetectomy and foraminotomy  4. Placement of L3, L4 and L5 screws using O-arm intraoperative stereotactic navigation  5. Posterolateral arthrodesis at L3-4 to prior L4-5 fusion with allograft and surgery site autograft  6.  Intraoperative monitoring for sensory potentials, stimulation of lumbosacral plexus,   7.  EVOKES        ANESTHESIA:  General     INDICATIONS FOR SURGERY:  The patient is a 74  y.o. with back and intractable radicular leg pain. His symptoms failed to respond to conservative intervention and he continued to have severe back pain and radicular pain referable to severe central stenosis at the L3-4 interspace above a prior  fusion.  He developed progressive ADL limiting radiculopathy. We reviewed the risks of the procedure to include but not limited to: bleeding (retroperitoneal), major vessel injury (aorta/vena cava),  infection, bowel injury, weakness, numbness, paralysis, persistent pain, anterior thigh pain (psoas), CSF leak, need for further  surgery and the lack of a resident or fellow assistant. He provided assistance with critical tissue retraction at parts of the surgery, wound closure and assistance with protecting critical neuro elements.           ESTIMATED BLOOD LOSS: 100 mL     COMPLICATIONS: No complications apparent.     DISPOSITION: The patient was transferred to the PACU in stable condition, awake, alert, and moving all extremities on command.     Dictated by: Ernesto House MD

## 2025-03-06 ENCOUNTER — APPOINTMENT (OUTPATIENT)
Dept: GENERAL RADIOLOGY | Age: 75
End: 2025-03-06
Attending: NEUROLOGICAL SURGERY
Payer: MEDICARE

## 2025-03-06 ENCOUNTER — APPOINTMENT (OUTPATIENT)
Dept: CT IMAGING | Age: 75
End: 2025-03-06
Attending: NEUROLOGICAL SURGERY
Payer: MEDICARE

## 2025-03-06 PROCEDURE — 97162 PT EVAL MOD COMPLEX 30 MIN: CPT

## 2025-03-06 PROCEDURE — 6360000002 HC RX W HCPCS: Performed by: NURSE PRACTITIONER

## 2025-03-06 PROCEDURE — 72131 CT LUMBAR SPINE W/O DYE: CPT

## 2025-03-06 PROCEDURE — 1200000000 HC SEMI PRIVATE

## 2025-03-06 PROCEDURE — 97530 THERAPEUTIC ACTIVITIES: CPT

## 2025-03-06 PROCEDURE — 36415 COLL VENOUS BLD VENIPUNCTURE: CPT

## 2025-03-06 PROCEDURE — 97166 OT EVAL MOD COMPLEX 45 MIN: CPT

## 2025-03-06 PROCEDURE — 6370000000 HC RX 637 (ALT 250 FOR IP): Performed by: NURSE PRACTITIONER

## 2025-03-06 PROCEDURE — 97535 SELF CARE MNGMENT TRAINING: CPT

## 2025-03-06 PROCEDURE — 97116 GAIT TRAINING THERAPY: CPT

## 2025-03-06 PROCEDURE — 72100 X-RAY EXAM L-S SPINE 2/3 VWS: CPT

## 2025-03-06 RX ORDER — POLYETHYLENE GLYCOL 3350 17 G/17G
17 POWDER, FOR SOLUTION ORAL DAILY
Status: DISCONTINUED | OUTPATIENT
Start: 2025-03-06 | End: 2025-03-08 | Stop reason: HOSPADM

## 2025-03-06 RX ADMIN — ACETAMINOPHEN 650 MG: 325 TABLET, FILM COATED ORAL at 15:34

## 2025-03-06 RX ADMIN — SENNOSIDES AND DOCUSATE SODIUM 1 TABLET: 50; 8.6 TABLET ORAL at 22:02

## 2025-03-06 RX ADMIN — ACETAMINOPHEN 650 MG: 325 TABLET, FILM COATED ORAL at 22:02

## 2025-03-06 RX ADMIN — ENOXAPARIN SODIUM 40 MG: 100 INJECTION SUBCUTANEOUS at 09:54

## 2025-03-06 RX ADMIN — OXYCODONE 10 MG: 5 TABLET ORAL at 15:34

## 2025-03-06 RX ADMIN — ACETAMINOPHEN 650 MG: 325 TABLET, FILM COATED ORAL at 09:50

## 2025-03-06 RX ADMIN — OXYCODONE 10 MG: 5 TABLET ORAL at 09:52

## 2025-03-06 RX ADMIN — SENNOSIDES AND DOCUSATE SODIUM 1 TABLET: 50; 8.6 TABLET ORAL at 09:54

## 2025-03-06 RX ADMIN — TAMSULOSIN HYDROCHLORIDE 0.4 MG: 0.4 CAPSULE ORAL at 22:02

## 2025-03-06 RX ADMIN — FAMOTIDINE 20 MG: 20 TABLET, FILM COATED ORAL at 09:54

## 2025-03-06 RX ADMIN — ATORVASTATIN CALCIUM 20 MG: 20 TABLET, FILM COATED ORAL at 09:54

## 2025-03-06 RX ADMIN — FAMOTIDINE 20 MG: 20 TABLET, FILM COATED ORAL at 22:02

## 2025-03-06 RX ADMIN — ACETAMINOPHEN 650 MG: 325 TABLET, FILM COATED ORAL at 03:55

## 2025-03-06 ASSESSMENT — PAIN DESCRIPTION - DESCRIPTORS
DESCRIPTORS: SHARP
DESCRIPTORS: SHARP

## 2025-03-06 ASSESSMENT — PAIN SCALES - GENERAL
PAINLEVEL_OUTOF10: 4
PAINLEVEL_OUTOF10: 7
PAINLEVEL_OUTOF10: 7
PAINLEVEL_OUTOF10: 4
PAINLEVEL_OUTOF10: 4

## 2025-03-06 ASSESSMENT — PAIN DESCRIPTION - LOCATION
LOCATION: BACK
LOCATION: BACK

## 2025-03-06 ASSESSMENT — PAIN DESCRIPTION - ORIENTATION: ORIENTATION: LEFT

## 2025-03-06 ASSESSMENT — PAIN DESCRIPTION - PAIN TYPE
TYPE: ACUTE PAIN;SURGICAL PAIN
TYPE: ACUTE PAIN;SURGICAL PAIN

## 2025-03-06 ASSESSMENT — PAIN DESCRIPTION - FREQUENCY: FREQUENCY: CONTINUOUS

## 2025-03-06 NOTE — CARE COORDINATION
Case Management Assessment  Initial Evaluation    Date/Time of Evaluation: 3/6/2025 12:45 PM  Assessment Completed by: Domenica Murphy RN    If patient is discharged prior to next notation, then this note serves as note for discharge by case management.    Patient Name: Brendon Bender                   YOB: 1950  Diagnosis: Spinal stenosis of lumbar region with neurogenic claudication [M48.062]  Lumbar stenosis with neurogenic claudication [M48.062]                   Date / Time: 3/5/2025  6:23 AM    Patient Admission Status: Inpatient   Readmission Risk (Low < 19, Mod (19-27), High > 27): Readmission Risk Score: 6.5    Current PCP: Emiliano Higgins MD  PCP verified by CM? No    Chart Reviewed: Yes      History Provided by: Patient  Patient Orientation: Alert and Oriented    Patient Cognition: Alert    Hospitalization in the last 30 days (Readmission):  No    If yes, Readmission Assessment in CM Navigator will be completed.    Advance Directives:      Code Status: Full Code   Patient's Primary Decision Maker is: Legal Next of Kin    Primary Decision Maker: Mariajose Flores - [facility] - [phone]    Discharge Planning:    Patient lives with: Spouse/Significant Other Type of Home: House  Primary Care Giver: Self  Patient Support Systems include: Spouse/Significant Other   Current Financial resources: Medicare  Current community resources: None  Current services prior to admission: None            Current DME:              Type of Home Care services:  None    ADLS  Prior functional level: Independent in ADLs/IADLs  Current functional level: Independent in ADLs/IADLs    PT AM-PAC: 18 /24  OT AM-PAC:   /24    Family can provide assistance at DC: Yes  Would you like Case Management to discuss the discharge plan with any other family members/significant others, and if so, who? No  Plans to Return to Present Housing: Yes  Other Identified Issues/Barriers to RETURNING to current housing:

## 2025-03-06 NOTE — PROGRESS NOTES
Occupational Therapy  Attempt   Pt just finished working with PT and received breakfast. Politely asking OT to come back later so he can eat. Will follow up.     Ann Adames, MOT, OTR/L, CNS

## 2025-03-06 NOTE — PROGRESS NOTES
NEUROSURGERY POST-OP PROGRESS NOTE    Patient Name: Brendon Bender YOB: 1950   Sex: Male Age: 74 yrs     Medical Record Number: [id number] Acct Number: [id number]   Room Number: 5518/5518-01 Hospital Day: Hospital Day: 2     Interval History:  Post-operative Day# 1 s/p Procedure(s) (LRB):  LEFT LUMBAR 3 LUMBAR 4 DIRECT LATERAL INTERBODY FUSION WITH POSTERIOR LUMBAR 3 LUMBAR 4 DECOMPRESSION FUSION FIXATION ABOVE PRIOR FUSION (Left)  . (N/A)    Subjective: No acute events overnight. Patient complains of incisional pain, which is managed with pain regimen. Expresses concern about constipation from opioids.     Objective:    VITAL SIGNS   /70   Pulse 70   Temp 98 °F (36.7 °C) (Oral)   Resp 16   Ht 1.702 m (5' 7\")   Wt 78.3 kg (172 lb 9.6 oz)   SpO2 96%   BMI 27.03 kg/m²    Height Height: 170.2 cm (5' 7\")   Weight Weight - Scale: 78.3 kg (172 lb 9.6 oz)        Allergies No Known Allergies   NPO Status ADULT DIET; Regular; Vegetarian (Lacto-Ovo)   Isolation No active isolations     LABS   Basic Metabolic Profile No results for input(s): \"NA\", \"POTASSIUM\", \"CL\", \"CO2\", \"BUN\", \"CREATININE\", \"GLUCOSE\", \"PHOS\", \"MG\" in the last 72 hours.    Invalid input(s): \"CA\", \"ALB\"   Complete Blood Count No results for input(s): \"WBC\", \"RBC\" in the last 72 hours.    Invalid input(s): \"HEMOGLOBIN\", \"HEMATOCRIT\"   Coagulation Studies No results for input(s): \"INR\" in the last 72 hours.    Invalid input(s): \"PLATELETS\", \"PROA\", \"PT\", \"PTTA\", \"PTT\"     MEDICATIONS   Inpatient Medications     atorvastatin, 20 mg, Oral, Daily    tamsulosin, 0.4 mg, Oral, Nightly    sodium chloride flush, 5-40 mL, IntraVENous, 2 times per day    acetaminophen, 650 mg, Oral, Q6H    sennosides-docusate sodium, 1 tablet, Oral, BID    famotidine, 20 mg, Oral, BID

## 2025-03-06 NOTE — PROGRESS NOTES
VSS on RA. Pt A+O x4. PT voiding via camejo. Pt endorses pain to lower back, pain controlled per MAR. Pt tolerating PO diet/fluids. All fall precautions in place, call light within reach.

## 2025-03-06 NOTE — PROGRESS NOTES
Physical Therapy  Facility/Department: [facility] ORTHO/NEURO  Physical Therapy Initial Assessment/Treatment    Name: Brendon Bender  : 1950  MRN: [id number]  Date of Service: 3/6/2025    Discharge Recommendations:  Home with Home health PT, 24 hour supervision or assist   PT Equipment Recommendations  Equipment Needed: Yes  Mobility Devices: Walker  Walker: Rolling      Patient Diagnosis(es): lumbar stenosis  Past Medical History:  has a past medical history of Enlarged prostate, Hyperlipidemia, Lumbar stenosis with neurogenic claudication, PONV (postoperative nausea and vomiting), and Retention of urine.  Past Surgical History:  has a past surgical history that includes Spine surgery (); lumbar fusion (Left, 3/5/2025); and lumbar fusion (N/A, 3/5/2025).    Assessment  Assessment: pt from home with his wife where he is typically IND with mobility without AD. Currently able to transfer, ambulate, negotiate curb step with RW and CGA to SBA. Pt requires cues for safety/walker mgmt. Mobility appears guarded and effortful. Edu on donning/doffing LSO and spinal precautions. Pt plans to DC home with his wife who can provide 24hr A, rec use of RW for mobility, HHPT at DC.  Treatment Diagnosis: gait difficulty  Therapy Prognosis: Good  Decision Making: Medium Complexity  Requires PT Follow-Up: Yes  Activity Tolerance  Activity Tolerance: Patient tolerated evaluation without incident;Patient limited by pain    Plan  Physical Therapy Plan  General Plan: 5-7 times per week  Current Treatment Recommendations: Strengthening, Balance training, Functional mobility training, Transfer training, Stair training, Gait training, Safety education & training, Pain management, Endurance training, Patient/Caregiver education & training, Therapeutic activities  Safety Devices  Type of Devices: Call light within reach, Chair alarm in place, Left in chair, Gait belt, Nurse  notified    Restrictions  Restrictions/Precautions  Required Braces or Orthoses?: Yes  Required Braces or Orthoses  Spinal: Lumbar Corset  Position Activity Restriction  Spinal Precautions: No Bending, No Lifting, No Twisting  Other Position/Activity Restrictions: ambulate patient     Subjective  General  Chart Reviewed: Yes  Patient assessed for rehabilitation services?: Yes  Additional Pertinent Hx: LEFT LUMBAR 3 LUMBAR 4 DIRECT LATERAL INTERBODY FUSION WITH POSTERIOR LUMBAR 3 LUMBAR 4 DECOMPRESSION FUSION FIXATION ABOVE PRIOR FUSION  Family/Caregiver Present: Yes (wife)  Referring Practitioner: Susie Albright APRN - CNP  Referral Date : 03/05/25  Diagnosis: lumbar stenosis  Follows Commands: Within Functional Limits  General  General Comments: pt cleared to see for therapy by RN.  Subjective  Subjective: pt semisupine in bed on arrival, agreeable to PT session. c/o pain 8/10, RN present at start administering pain medications.         Social/Functional History  Social/Functional History  Lives With: Spouse  Type of Home:  ([facility])  Home Layout: One level, Able to Live on Main level with bedroom/bathroom (+basement)  Home Access: Stairs to enter without rails  Entrance Stairs - Number of Steps: 1+1  Bathroom Shower/Tub: Walk-in shower  Bathroom Toilet: Handicap height  Bathroom Equipment: Hand-held shower, Shower chair  Home Equipment: None  Has the patient had two or more falls in the past year or any fall with injury in the past year?: No  Prior Level of Assist for ADLs: Independent  Prior Level of Assist for Homemaking: Needs assistance (pt does laundry, wife does cleaning/cooking)  Prior Level of Assist for Transfers: Independent  Active : Yes  Occupation: Retired  Type of Occupation:   Leisure & Hobbies: watch TV  Vision/Hearing  Vision  Vision: Impaired  Vision Exceptions: Wears glasses at all times  Hearing  Hearing: Within functional limits    Cognition   Orientation  Orientation

## 2025-03-06 NOTE — PLAN OF CARE
Problem: Safety - Adult  Goal: Free from fall injury  Outcome: Progressing   All fall precautions in place. Bed locked and in lowest position with alarm on. Overbed table and personal belonings within reach. Call light within reach and patient instructed to use call light for assistance. Non-skid socks on.    Problem: Pain  Goal: Verbalizes/displays adequate comfort level or baseline comfort level  Outcome: Progressing   Pt endorsing pain to lower back. Being treated with PRN pain medication, rest, and frequent repositioning with pillow support for comfort and pressure relief. Pt reports some relief from pain with above interventions.

## 2025-03-06 NOTE — PROGRESS NOTES
Occupational Therapy  Facility/Department: [facility] ORTHO/NEURO  Occupational Therapy Initial Assessment    Name: Brendon Bender  : 1950  MRN: [id number]  Date of Service: 3/6/2025    Discharge Recommendations:  24 hour supervision or assist, Home with assist PRN  OT Equipment Recommendations  Equipment Needed: Yes  Other: reacher, sock aid, LHSH         Past Medical History:  has a past medical history of Enlarged prostate, Hyperlipidemia, Lumbar stenosis with neurogenic claudication, PONV (postoperative nausea and vomiting), and Retention of urine.  Past Surgical History:  has a past surgical history that includes Spine surgery (); lumbar fusion (Left, 3/5/2025); and lumbar fusion (N/A, 3/5/2025).    Treatment Diagnosis: imp mob, tf, ADL      Assessment  Performance deficits / Impairments: Decreased functional mobility ;Decreased ADL status;Decreased endurance  Assessment: From home with spouse, admit with spinal stenosis s/p LEFT LUMBAR 3 LUMBAR 4 DIRECT LATERAL INTERBODY FUSION WITH POSTERIOR LUMBAR 3 LUMBAR 4 DECOMPRESSION FUSION FIXATION ABOVE PRIOR FUSION. Pt completing mobility and transfers with CGA to SBA this date with RW. Completing ADLs with assist. Edu for LB dressing with reacher and sock aid for BLT precautions, pt and spouse verb/demo understanding.  Pt would benefit from cont therapies while in acute care. Rec home with assist. Cont POC.  Treatment Diagnosis: imp mob, tf, ADL  REQUIRES OT FOLLOW-UP: Yes  Activity Tolerance  Activity Tolerance: Patient Tolerated treatment well     Plan  Occupational Therapy Plan  Times Per Week: 5-7  Current Treatment Recommendations: Strengthening, ROM, Balance training, Functional mobility training, Endurance training, Safety education & training, Patient/Caregiver education & training, Self-Care / ADL    Restrictions  Restrictions/Precautions  Required Braces or Orthoses?: Yes  Required Braces or Orthoses  Spinal: Lumbar Corset  Position

## 2025-03-07 LAB
ANION GAP SERPL CALCULATED.3IONS-SCNC: 10 MMOL/L (ref 3–16)
BASOPHILS # BLD: 0 K/UL (ref 0–0.2)
BASOPHILS NFR BLD: 0.3 %
BUN SERPL-MCNC: 14 MG/DL (ref 7–20)
CALCIUM SERPL-MCNC: 8.9 MG/DL (ref 8.3–10.6)
CHLORIDE SERPL-SCNC: 104 MMOL/L (ref 99–110)
CO2 SERPL-SCNC: 23 MMOL/L (ref 21–32)
CREAT SERPL-MCNC: 0.9 MG/DL (ref 0.8–1.3)
DEPRECATED RDW RBC AUTO: 13.7 % (ref 12.4–15.4)
EOSINOPHIL # BLD: 0.1 K/UL (ref 0–0.6)
EOSINOPHIL NFR BLD: 0.7 %
GFR SERPLBLD CREATININE-BSD FMLA CKD-EPI: >90 ML/MIN/{1.73_M2}
GLUCOSE SERPL-MCNC: 98 MG/DL (ref 70–99)
HCT VFR BLD AUTO: 36 % (ref 40.5–52.5)
HGB BLD-MCNC: 11.9 G/DL (ref 13.5–17.5)
LYMPHOCYTES # BLD: 2.3 K/UL (ref 1–5.1)
LYMPHOCYTES NFR BLD: 22 %
MCH RBC QN AUTO: 30.5 PG (ref 26–34)
MCHC RBC AUTO-ENTMCNC: 33.1 G/DL (ref 31–36)
MCV RBC AUTO: 92.1 FL (ref 80–100)
MONOCYTES # BLD: 1 K/UL (ref 0–1.3)
MONOCYTES NFR BLD: 9.7 %
NEUTROPHILS # BLD: 7 K/UL (ref 1.7–7.7)
NEUTROPHILS NFR BLD: 67.3 %
PLATELET # BLD AUTO: 189 K/UL (ref 135–450)
PMV BLD AUTO: 8.2 FL (ref 5–10.5)
POTASSIUM SERPL-SCNC: 3.9 MMOL/L (ref 3.5–5.1)
RBC # BLD AUTO: 3.91 M/UL (ref 4.2–5.9)
SODIUM SERPL-SCNC: 137 MMOL/L (ref 136–145)
WBC # BLD AUTO: 10.4 K/UL (ref 4–11)

## 2025-03-07 PROCEDURE — 6370000000 HC RX 637 (ALT 250 FOR IP): Performed by: NURSE PRACTITIONER

## 2025-03-07 PROCEDURE — 97535 SELF CARE MNGMENT TRAINING: CPT

## 2025-03-07 PROCEDURE — 80048 BASIC METABOLIC PNL TOTAL CA: CPT

## 2025-03-07 PROCEDURE — 2500000003 HC RX 250 WO HCPCS: Performed by: NURSE PRACTITIONER

## 2025-03-07 PROCEDURE — 85025 COMPLETE CBC W/AUTO DIFF WBC: CPT

## 2025-03-07 PROCEDURE — 36415 COLL VENOUS BLD VENIPUNCTURE: CPT

## 2025-03-07 PROCEDURE — 6370000000 HC RX 637 (ALT 250 FOR IP)

## 2025-03-07 PROCEDURE — 2060000000 HC ICU INTERMEDIATE R&B

## 2025-03-07 PROCEDURE — 97530 THERAPEUTIC ACTIVITIES: CPT

## 2025-03-07 PROCEDURE — 6360000002 HC RX W HCPCS: Performed by: NURSE PRACTITIONER

## 2025-03-07 RX ORDER — SENNA AND DOCUSATE SODIUM 50; 8.6 MG/1; MG/1
1 TABLET, FILM COATED ORAL DAILY PRN
COMMUNITY
Start: 2025-03-07

## 2025-03-07 RX ORDER — OXYCODONE HYDROCHLORIDE 5 MG/1
5 TABLET ORAL EVERY 6 HOURS PRN
Qty: 28 TABLET | Refills: 0 | Status: SHIPPED | OUTPATIENT
Start: 2025-03-07 | End: 2025-03-14

## 2025-03-07 RX ORDER — POLYETHYLENE GLYCOL 3350 17 G/17G
17 POWDER, FOR SOLUTION ORAL DAILY PRN
COMMUNITY
Start: 2025-03-07 | End: 2025-04-06

## 2025-03-07 RX ORDER — METHOCARBAMOL 750 MG/1
750 TABLET, FILM COATED ORAL EVERY 8 HOURS PRN
Qty: 30 TABLET | Refills: 0 | Status: SHIPPED | OUTPATIENT
Start: 2025-03-07 | End: 2025-03-17

## 2025-03-07 RX ADMIN — METHOCARBAMOL 750 MG: 750 TABLET ORAL at 17:40

## 2025-03-07 RX ADMIN — POLYETHYLENE GLYCOL 3350 17 G: 17 POWDER, FOR SOLUTION ORAL at 08:22

## 2025-03-07 RX ADMIN — ATORVASTATIN CALCIUM 20 MG: 20 TABLET, FILM COATED ORAL at 08:23

## 2025-03-07 RX ADMIN — FAMOTIDINE 20 MG: 20 TABLET, FILM COATED ORAL at 08:22

## 2025-03-07 RX ADMIN — METHOCARBAMOL 750 MG: 750 TABLET ORAL at 10:28

## 2025-03-07 RX ADMIN — FAMOTIDINE 20 MG: 20 TABLET, FILM COATED ORAL at 20:02

## 2025-03-07 RX ADMIN — SODIUM CHLORIDE, PRESERVATIVE FREE 10 ML: 5 INJECTION INTRAVENOUS at 08:35

## 2025-03-07 RX ADMIN — OXYCODONE 5 MG: 5 TABLET ORAL at 18:42

## 2025-03-07 RX ADMIN — OXYCODONE 5 MG: 5 TABLET ORAL at 13:41

## 2025-03-07 RX ADMIN — ACETAMINOPHEN 650 MG: 325 TABLET, FILM COATED ORAL at 20:02

## 2025-03-07 RX ADMIN — ACETAMINOPHEN 650 MG: 325 TABLET, FILM COATED ORAL at 08:22

## 2025-03-07 RX ADMIN — TAMSULOSIN HYDROCHLORIDE 0.4 MG: 0.4 CAPSULE ORAL at 20:02

## 2025-03-07 RX ADMIN — ACETAMINOPHEN 650 MG: 325 TABLET, FILM COATED ORAL at 01:40

## 2025-03-07 RX ADMIN — OXYCODONE 5 MG: 5 TABLET ORAL at 08:34

## 2025-03-07 RX ADMIN — OXYCODONE 10 MG: 5 TABLET ORAL at 22:40

## 2025-03-07 RX ADMIN — SENNOSIDES AND DOCUSATE SODIUM 1 TABLET: 50; 8.6 TABLET ORAL at 08:22

## 2025-03-07 RX ADMIN — SENNOSIDES AND DOCUSATE SODIUM 1 TABLET: 50; 8.6 TABLET ORAL at 20:02

## 2025-03-07 RX ADMIN — ENOXAPARIN SODIUM 40 MG: 100 INJECTION SUBCUTANEOUS at 08:22

## 2025-03-07 RX ADMIN — ACETAMINOPHEN 650 MG: 325 TABLET, FILM COATED ORAL at 13:41

## 2025-03-07 ASSESSMENT — PAIN SCALES - GENERAL
PAINLEVEL_OUTOF10: 0
PAINLEVEL_OUTOF10: 0
PAINLEVEL_OUTOF10: 5
PAINLEVEL_OUTOF10: 3
PAINLEVEL_OUTOF10: 3
PAINLEVEL_OUTOF10: 2
PAINLEVEL_OUTOF10: 5
PAINLEVEL_OUTOF10: 8
PAINLEVEL_OUTOF10: 5
PAINLEVEL_OUTOF10: 3
PAINLEVEL_OUTOF10: 3
PAINLEVEL_OUTOF10: 0

## 2025-03-07 ASSESSMENT — PAIN DESCRIPTION - FREQUENCY
FREQUENCY: CONTINUOUS
FREQUENCY: CONTINUOUS

## 2025-03-07 ASSESSMENT — PAIN DESCRIPTION - PAIN TYPE
TYPE: SURGICAL PAIN
TYPE: SURGICAL PAIN

## 2025-03-07 ASSESSMENT — PAIN DESCRIPTION - ONSET
ONSET: ON-GOING
ONSET: ON-GOING

## 2025-03-07 ASSESSMENT — PAIN DESCRIPTION - ORIENTATION
ORIENTATION: MID

## 2025-03-07 ASSESSMENT — PAIN DESCRIPTION - LOCATION
LOCATION: BACK

## 2025-03-07 ASSESSMENT — PAIN - FUNCTIONAL ASSESSMENT
PAIN_FUNCTIONAL_ASSESSMENT: ACTIVITIES ARE NOT PREVENTED
PAIN_FUNCTIONAL_ASSESSMENT: ACTIVITIES ARE NOT PREVENTED

## 2025-03-07 ASSESSMENT — PAIN DESCRIPTION - DESCRIPTORS
DESCRIPTORS: ACHING;DISCOMFORT
DESCRIPTORS: ACHING;DISCOMFORT
DESCRIPTORS: ACHING

## 2025-03-07 NOTE — PROGRESS NOTES
VSS on RA. Pt A+O x4. PT voiding via BRP x1 GB walker. Pt endorses pain to lower back, pain controlled per MAR. Pt tolerating PO diet/fluids. All fall precautions in place, call light within reach.

## 2025-03-07 NOTE — PLAN OF CARE
Problem: Safety - Adult  Goal: Free from fall injury  Note: Alert oriented times 4 , Hemovac activated CDI , compressed , left leg weaker than right , ambulate in ruano times 3 , gait steady , voiding ,clear yellow urine in toilet and urinal  , bladder soft to palpation , meeting pain goal with oral meds

## 2025-03-07 NOTE — PLAN OF CARE
Problem: Discharge Planning  Goal: Discharge to home or other facility with appropriate resources  Outcome: Progressing  Flowsheets (Taken 3/7/2025 0800)  Discharge to home or other facility with appropriate resources: Identify barriers to discharge with patient and caregiver   Pt will be assessed for readiness to discharge this shift  Problem: Safety - Adult  Goal: Free from fall injury  3/7/2025 1118 by Timothy Aguayo, RN  Outcome: Progressing  Flowsheets (Taken 3/7/2025 1118)  Free From Fall Injury: Instruct family/caregiver on patient safety  Pt will be free from falls and injury this shift  Problem: ABCDS Injury Assessment  Goal: Absence of physical injury  Outcome: Progressing  Flowsheets  Taken 3/7/2025 1118 by Timothy Aguayo, RN  Absence of Physical Injury: Implement safety measures based on patient assessment  Taken 3/7/2025 0316 by Mattie Abrams, RN  Absence of Physical Injury: Implement safety measures based on patient assessment   All fall precautions remain in place

## 2025-03-07 NOTE — PROGRESS NOTES
Physical Therapy  Brendon Bender    Chart reviewed.  PT attempted to see patient for follow up treatment although patient currently declining to participate in PT, \"just finished walking with OT.\"  Patient reports up ambulating with staff and RW throughout the day, requesting to rest at this time.  Planning to d/c home tomorrow morning.  Discussed with RN.  Plan to continue to follow per plan of care.    Allyson Colvin PT, DPT [id number]

## 2025-03-07 NOTE — CARE COORDINATION
CM spoke with patient and spouse at bedside.  They would like home care at discharge, no preference to agency, [facility] accepted.  Patient will receive RW to bedside today.  Family will transport home.    Domenica Murphy RN, BSN,    Ortho/Neuro   [phone]

## 2025-03-07 NOTE — PROGRESS NOTES
Occupational Therapy  Occupational Therapy  Daily Treatment Note  Patient Name: Brendon Bender  MRN: [id number]    Chart Reviewed: Yes       Other Position/Activity Restrictions: ambulate patient     Additional Pertinent Hx: LEFT LUMBAR 3 LUMBAR 4 DIRECT LATERAL INTERBODY FUSION WITH POSTERIOR LUMBAR 3 LUMBAR 4 DECOMPRESSION FUSION FIXATION ABOVE PRIOR FUSION        Diagnosis: spinal stenosis lumbar  Treatment Diagnosis: imp mob, tf, ADL    Restrictions  Restrictions/Precautions  Required Braces or Orthoses?: Yes  Required Braces or Orthoses  Spinal: Lumbar Corset  Position Activity Restriction  Spinal Precautions: No Bending, No Lifting, No Twisting  Other Position/Activity Restrictions: ambulate patient     Subjective:   Pt seated in recliner chair and agreeable to OT session.     Pain: Pt reporting pain in low back with RN aware and admin pain med at beginning of session. Pt repositioned to comfort at end of session.     Social/Functional History  Lives With: Spouse  Type of Home:  ([facility])  Home Layout: One level, Able to Live on Main level with bedroom/bathroom (+basement)  Home Access: Stairs to enter without rails  Entrance Stairs - Number of Steps: 1+1  Bathroom Shower/Tub: Walk-in shower  Bathroom Toilet: Handicap height  Bathroom Equipment: Hand-held shower, Shower chair  Home Equipment: None  Has the patient had two or more falls in the past year or any fall with injury in the past year?: No  Prior Level of Assist for ADLs: Independent  Prior Level of Assist for Homemaking: Needs assistance (pt does laundry, wife does cleaning/cooking)  Prior Level of Assist for Transfers: Independent  Active : Yes  Occupation: Retired  Type of Occupation:   Leisure & Hobbies: watch TV  Prior Function  Prior Level of Assist for ADLs: Independent  Prior Level of Assist for Homemaking: Needs assistance (pt does laundry, wife does cleaning/cooking)  Prior Level of Assist for Transfers:  ongoing  Short Term Goal 4: UB/LB dressing with SPVN - ongoing         Plan:      Times Per Week: 5-7        If patient is discharged prior to next treatment, this note will serve as the discharge summary.      MARIEL Caldwell/DENNY

## 2025-03-07 NOTE — PLAN OF CARE
Problem: Safety - Adult  Goal: Free from fall injury  3/7/2025 0317 by Mattie Abrams, RN  Outcome: Progressing   All fall precautions in place. Bed locked and in lowest position with alarm on. Overbed table and personal belonings within reach. Call light within reach and patient instructed to use call light for assistance. Non-skid socks on.    Problem: Pain  Goal: Verbalizes/displays adequate comfort level or baseline comfort level  Outcome: Progressing   Pt endorsing pain to lower back. Being treated with PRN pain medication, rest, and frequent repositioning with pillow support for comfort and pressure relief. Pt reports some relief from pain with above interventions.

## 2025-03-07 NOTE — PROGRESS NOTES
All fall precautions in place. Pt has been OOBTC this shift and has walked around the unit. Brace worn when OOB.

## 2025-03-07 NOTE — DISCHARGE INSTRUCTIONS
[facility] Sp[facility] Program Survey  The [facility] values your feedback related to your recent hospital visit and admission. We strive to improve our program to promote better outcomes and recoveries for all our patients.  The survey code below consists of a few questions that are related to your stay and around your Spine diagnosis, treatment, and recovery. The estimated length of time needed to complete this survey is 4 minutes or less. Thank you for completing this survey!

## 2025-03-07 NOTE — PROGRESS NOTES
NEUROSURGERY POST-OP PROGRESS NOTE    Patient Name: Brendon Bender YOB: 1950   Sex: Male Age: 74 yrs     Medical Record Number: [id number] Acct Number: [id number]   Room Number: 5518/5518-01 Hospital Day: Hospital Day: 3     Interval History:  Post-operative Day#2 s/p Procedure(s) (LRB):  LEFT LUMBAR 3 LUMBAR 4 DIRECT LATERAL INTERBODY FUSION WITH POSTERIOR LUMBAR 3 LUMBAR 4 DECOMPRESSION FUSION FIXATION ABOVE PRIOR FUSION (Left)  . (N/A)    Subjective: No acute events overnight. Patient reports incisional pain is controlled with current regimen. He is able to ambulate to the bathroom. Neuro exam is stable.    Objective:    VITAL SIGNS   /77   Pulse 68   Temp 98 °F (36.7 °C) (Oral)   Resp 16   Ht 1.702 m (5' 7\")   Wt 78.3 kg (172 lb 9.6 oz)   SpO2 97%   BMI 27.03 kg/m²    Height Height: 170.2 cm (5' 7\")   Weight Weight - Scale: 78.3 kg (172 lb 9.6 oz)        Allergies No Known Allergies   NPO Status ADULT DIET; Regular; Vegetarian (Lacto-Ovo)   Isolation No active isolations     LABS   Basic Metabolic Profile No results for input(s): \"NA\", \"POTASSIUM\", \"CL\", \"CO2\", \"BUN\", \"CREATININE\", \"GLUCOSE\", \"PHOS\", \"MG\" in the last 72 hours.    Invalid input(s): \"CA\", \"ALB\"   Complete Blood Count No results for input(s): \"WBC\", \"RBC\" in the last 72 hours.    Invalid input(s): \"HEMOGLOBIN\", \"HEMATOCRIT\"   Coagulation Studies No results for input(s): \"INR\" in the last 72 hours.    Invalid input(s): \"PLATELETS\", \"PROA\", \"PT\", \"PTTA\", \"PTT\"     MEDICATIONS   Inpatient Medications     polyethylene glycol, 17 g, Oral, Daily    atorvastatin, 20 mg, Oral, Daily    tamsulosin, 0.4 mg, Oral, Nightly    sodium chloride flush, 5-40 mL, IntraVENous, 2 times per day    acetaminophen, 650 mg, Oral, Q6H    sennosides-docusate sodium, 1 tablet,

## 2025-03-08 VITALS
SYSTOLIC BLOOD PRESSURE: 161 MMHG | BODY MASS INDEX: 27.09 KG/M2 | HEIGHT: 67 IN | DIASTOLIC BLOOD PRESSURE: 89 MMHG | WEIGHT: 172.6 LBS | OXYGEN SATURATION: 96 % | RESPIRATION RATE: 16 BRPM | TEMPERATURE: 97.9 F | HEART RATE: 76 BPM

## 2025-03-08 PROCEDURE — 6370000000 HC RX 637 (ALT 250 FOR IP)

## 2025-03-08 PROCEDURE — 6370000000 HC RX 637 (ALT 250 FOR IP): Performed by: NURSE PRACTITIONER

## 2025-03-08 PROCEDURE — 6360000002 HC RX W HCPCS: Performed by: NURSE PRACTITIONER

## 2025-03-08 RX ADMIN — ONDANSETRON 4 MG: 4 TABLET, ORALLY DISINTEGRATING ORAL at 08:58

## 2025-03-08 RX ADMIN — FAMOTIDINE 20 MG: 20 TABLET, FILM COATED ORAL at 08:22

## 2025-03-08 RX ADMIN — ENOXAPARIN SODIUM 40 MG: 100 INJECTION SUBCUTANEOUS at 08:22

## 2025-03-08 RX ADMIN — ATORVASTATIN CALCIUM 20 MG: 20 TABLET, FILM COATED ORAL at 08:22

## 2025-03-08 RX ADMIN — ACETAMINOPHEN 650 MG: 325 TABLET, FILM COATED ORAL at 08:22

## 2025-03-08 RX ADMIN — POLYETHYLENE GLYCOL 3350 17 G: 17 POWDER, FOR SOLUTION ORAL at 08:22

## 2025-03-08 RX ADMIN — OXYCODONE 10 MG: 5 TABLET ORAL at 03:52

## 2025-03-08 RX ADMIN — SENNOSIDES AND DOCUSATE SODIUM 1 TABLET: 50; 8.6 TABLET ORAL at 08:22

## 2025-03-08 RX ADMIN — ACETAMINOPHEN 650 MG: 325 TABLET, FILM COATED ORAL at 03:52

## 2025-03-08 RX ADMIN — ONDANSETRON 4 MG: 2 INJECTION INTRAMUSCULAR; INTRAVENOUS at 11:31

## 2025-03-08 ASSESSMENT — PAIN SCALES - GENERAL
PAINLEVEL_OUTOF10: 0
PAINLEVEL_OUTOF10: 7
PAINLEVEL_OUTOF10: 4

## 2025-03-08 ASSESSMENT — PAIN DESCRIPTION - LOCATION: LOCATION: BACK

## 2025-03-08 ASSESSMENT — PAIN SCALES - WONG BAKER: WONGBAKER_NUMERICALRESPONSE: NO HURT

## 2025-03-08 ASSESSMENT — PAIN DESCRIPTION - FREQUENCY: FREQUENCY: CONTINUOUS

## 2025-03-08 ASSESSMENT — PAIN DESCRIPTION - ORIENTATION: ORIENTATION: MID

## 2025-03-08 ASSESSMENT — PAIN DESCRIPTION - PAIN TYPE: TYPE: CHRONIC PAIN

## 2025-03-08 ASSESSMENT — PAIN - FUNCTIONAL ASSESSMENT: PAIN_FUNCTIONAL_ASSESSMENT: ACTIVITIES ARE NOT PREVENTED

## 2025-03-08 ASSESSMENT — PAIN DESCRIPTION - DESCRIPTORS: DESCRIPTORS: ACHING

## 2025-03-08 ASSESSMENT — PAIN DESCRIPTION - ONSET: ONSET: ON-GOING

## 2025-03-08 NOTE — DISCHARGE SUMMARY
Discharge Summary    Date of Admission: 3/5/2025  6:23 AM  Date of Discharge: 3/8/2025  Admission Diagnosis: lumbar radiculopathy   Patient Active Problem List   Diagnosis    Lumbar stenosis with neurogenic claudication     Discharge Diagnosis: Same   Condition on Discharge: good  Attending for Admission: Dr. House     Reason for Admission: Brendon Bender is a 74 y.o. male patient who was admitted to the hospital for complaints of leg pain. He underwent the procedure listed above on 3/5/2025.     Hospital Course: After surgery, His pre-operative  pain was Absent . He complained of pain pain . The pain was well-controlled on oral medications.  His brace was in place. The dressing was clean, dry and intact. There was no erythema or edema around the surgical site. Prior to discharge He was eating well, urinating and ambulating with a steady gait with PT/OT.     Discharge Vitals/Labs: /80   Pulse 77   Temp 97.9 °F (36.6 °C) (Oral)   Resp 18   Ht 1.702 m (5' 7\")   Wt 78.3 kg (172 lb 9.6 oz)   SpO2 97%   BMI 27.03 kg/m²   CBC:   Lab Results   Component Value Date/Time    WBC 10.4 03/07/2025 09:59 AM    RBC 3.91 03/07/2025 09:59 AM    HGB 11.9 03/07/2025 09:59 AM    HCT 36.0 03/07/2025 09:59 AM    MCV 92.1 03/07/2025 09:59 AM    MCH 30.5 03/07/2025 09:59 AM    MCHC 33.1 03/07/2025 09:59 AM    RDW 13.7 03/07/2025 09:59 AM     03/07/2025 09:59 AM    MPV 8.2 03/07/2025 09:59 AM      Discharge Medications:      Medication List        START taking these medications      methocarbamol 750 MG tablet  Commonly known as: ROBAXIN  Take 1 tablet by mouth every 8 hours as needed (spasms)     oxyCODONE 5 MG immediate release tablet  Commonly known as: ROXICODONE  Take 1 tablet by mouth every 6 hours as needed for Pain for up to 7 days. Max Daily Amount: 20 mg     polyethylene glycol 17 g packet  Commonly known as: GLYCOLAX  Take 1 packet by mouth daily as needed for Constipation     sennosides-docusate  sodium 8.6-50 MG tablet  Commonly known as: SENOKOT-S  Take 1 tablet by mouth daily as needed for Constipation            CHANGE how you take these medications      gabapentin 300 MG capsule  Commonly known as: NEURONTIN  Take 1 capsule by mouth 3 times daily for 30 days.  What changed:   when to take this  reasons to take this     tamsulosin 0.4 MG capsule  Commonly known as: FLOMAX  TAKE 1 CAPSULE BY MOUTH DAILY  What changed: when to take this            CONTINUE taking these medications      atorvastatin 20 MG tablet  Commonly known as: LIPITOR  Take 1 tablet by mouth daily            STOP taking these medications      celecoxib 200 MG capsule  Commonly known as: CeleBREX     MULTIVITAMIN & MINERAL PO               Where to Get Your Medications        These medications were sent to [facility] - [place], [place] - [address] - P [phone] - F [phone] [address] [place] [address]      Phone: [phone]   methocarbamol 750 MG tablet  oxyCODONE 5 MG immediate release tablet       You can get these medications from any pharmacy    You don't need a prescription for these medications  polyethylene glycol 17 g packet  sennosides-docusate sodium 8.6-50 MG tablet       Discharge Destination: The patient was discharged to Home.     Follow-up: The patient is to follow-up with Dr. House   Discharge Instructions: Verbal and written discharge instructions as well as dressing change instructions were given to the patient at the time of consent. No NSAIDS or anticoagulation for 1 week post-operatively. No driving or riding in a motor vehicle. No lifting or bending.

## 2025-03-08 NOTE — PLAN OF CARE
Problem: Discharge Planning  Goal: Discharge to home or other facility with appropriate resources  Outcome: Progressing  Flowsheets (Taken 3/8/2025 0739)  Discharge to home or other facility with appropriate resources:   Identify discharge learning needs (meds, wound care, etc)   Identify barriers to discharge with patient and caregiver   Arrange for needed discharge resources and transportation as appropriate   Refer to discharge planning if patient needs post-hospital services based on physician order or complex needs related to functional status, cognitive ability or social support system  Note: Patient updated and educated on barriers to discharge and plan of care.      Problem: Safety - Adult  Goal: Free from fall injury  Outcome: Progressing  Flowsheets (Taken 3/8/2025 0739)  Free From Fall Injury: Instruct family/caregiver on patient safety  Note: Patient has remained free of fall injury this shift.      Problem: ABCDS Injury Assessment  Goal: Absence of physical injury  Outcome: Progressing  Flowsheets (Taken 3/8/2025 0739)  Absence of Physical Injury: Implement safety measures based on patient assessment     Problem: Pain  Goal: Verbalizes/displays adequate comfort level or baseline comfort level  Outcome: Progressing  Flowsheets (Taken 3/8/2025 0739)  Verbalizes/displays adequate comfort level or baseline comfort level:   Encourage patient to monitor pain and request assistance   Administer analgesics based on type and severity of pain and evaluate response   Assess pain using appropriate pain scale   Consider cultural and social influences on pain and pain management   Implement non-pharmacological measures as appropriate and evaluate response  Note: Pain currently managed per MAR and non-pharm measures such as rest and repositioning to reduce pain and promote comfort.

## 2025-03-08 NOTE — PROGRESS NOTES
Patient is alert and oriented. Vital signs are stable. Patient's pain is controlled with medication per MAR. Patient ambulates x1 with a walker. Patient tolerates ambulation well. Hemovac remains in place. Bed is in the lowest position. Bed alarm is activated. Call light is within reach. Will continue to monitor and reassess.

## 2025-03-08 NOTE — PROGRESS NOTES
Patient is A&O x4.  Vitals:    03/08/25 1130   BP: (!) 161/89   Pulse: 76   Resp: 16   Temp: 97.9 °F (36.6 °C)   SpO2: 96%    Patient has endorsed pain to lower back managed well per MAR and non-pharm measures. Patient is tolerating PO diet. Tolerating ambulation x1 assist with walker and gait belt. Wearing brace when OOB. Voiding via BRP. Patient updated on plan of care. Fall and safety precautions in place, call light within reach.

## 2025-03-08 NOTE — CARE COORDINATION
Case Management Assessment            Discharge Note                    Date / Time of Note: 3/8/2025 1:00 PM                  Discharge Note Completed by: URIEL Miranda, LSW    Patient Name: Brendon Bender   YOB: 1950  Diagnosis: Spinal stenosis of lumbar region with neurogenic claudication [M48.062]  Lumbar stenosis with neurogenic claudication [M48.062]   Date / Time: 3/5/2025  6:23 AM    Current PCP: Emiliano Higgins MD  Clinic patient: No    Hospitalization in the last 30 days: No       Advance Directives:  Code Status: Full Code  [place] DNR form completed and on chart: No    Financial:  Payor: MEDICARE / Plan: MEDICARE PART A AND B / Product Type: *No Product type* /      Pharmacy:    [facility] - [place], [place] - [address] - P [phone] - F [phone] [address] [facility]  [place] [address]  Phone: [phone] Fax: [phone]      Assistance purchasing medications?:    Assistance provided by Case Management: None at this time    Does patient want to participate in local refill/ meds to beds program?: Yes    Meds To Beds General Rules:  1. Can ONLY be done Monday- Friday between 8:30am-5pm  2. Prescription(s) must be in pharmacy by 3pm to be filled same day  3.Copy of patient's insurance/ prescription drug card and patient face sheet must be sent along with the prescription(s)  4. Cost of Rx cannot be added to hospital bill. If financial assistance is needed, please contact unit  or ;  or  CANNOT provide pharmacy voucher for patients co-pays  5. Patients can then  the prescription on their way out of the hospital at discharge, or pharmacy can deliver to the bedside if staff is available. (payment due at time of pick-up or delivery - cash, check, or card accepted)     Able to afford home medications/ co-pay costs: Yes    ADLS:  Current PT AM-PAC Score: 18 /24  Current OT AM-PAC Score: 20  neurogenic claudication [M48.062]    The Patient and/or patient representative Brendon and his family were provided with a choice of provider and agrees with the discharge plan Yes    Freedom of choice list was provided with basic dialogue that supports the patient's individualized plan of care/goals and shares the quality data associated with the providers. Yes    Care Transitions patient: Yes    Angie Issa, MSW, LSW  [facility] [place]  Case Management Department  Ph: [phone]

## 2025-03-08 NOTE — PROGRESS NOTES
Neurosurgery Progress Note    3/8/2025 11:14 AM                               Brendon Bender                      LOS: 3 days             Day#3 s/p Procedure(s) (LRB):  LEFT LUMBAR 3 LUMBAR 4 DIRECT LATERAL INTERBODY FUSION WITH POSTERIOR LUMBAR 3 LUMBAR 4 DECOMPRESSION FUSION FIXATION ABOVE PRIOR FUSION (Left)  Subjective:  No acute events overnight. Patient has no specific complaints this am.     Pt was nauseas but now feeling better                                             Physical Exam:    Temp (24hrs), Av °F (36.7 °C), Min:97.8 °F (36.6 °C), Max:98.4 °F (36.9 °C)      Neuro Exam  The patient is well-appearing and is in no acute distress.    Alert and oriented ×4, appropriate, conversant with normal mood and affect.    Motor strength: LLE chronic weakness    Right  Left      Right  Left    Deltoid  4+ 4+   Hip Flex  5 3+   Biceps  4+ 4+   Knee Extensors  5 3+   Triceps  4+ 4+   Knee Flexors  5 3+   Wrist Ext  4+ 4+   Ankle Dorsiflex.  5 4   Wrist Flex  4+ 4+   Ankle Plantarflex.  5 4   Handgrip  4+ 4+   Ext Brandan Longus  5 4   Thumb Ext  4+ 4+             Labs:  Recent Labs     25  0959   WBC 10.4   HGB 11.9*   HCT 36.0*          Recent Labs     25  0959      K 3.9      CO2 23   BUN 14   CREATININE 0.9   GLUCOSE 98   CALCIUM 8.9     Drain 35  No results for input(s): \"PROTIME\", \"INR\", \"APTT\" in the last 72 hours.      Assessment and Plan:  LEFT LUMBAR 3 LUMBAR 4 DIRECT LATERAL INTERBODY FUSION WITH POSTERIOR LUMBAR 3 LUMBAR 4 DECOMPRESSION FUSION FIXATION ABOVE PRIOR FUSION (Left)  . (N/A) per Dr. House. Post-op lumbar CT and XR without evidence of hardware complication.     Plan:  ok to dc drain and dc home if nausea resolved  Neurologic exam frequency: Q4H  Mobility: Ambulation as tolerated, PT/OT, AM-PAC mobility score of 18 and 19, recommending [facility]    Brace: LSO when up and out of bed  Drain: Continue. OK to remove   DVT Prophylaxis: SCDs and SQ Lovenox  GI  Prophylaxis: Pepcid  Bowel Regimen: Senokot-S, Glycolax, Dulcolax  Pain control: Tylenol, Roxicodone  Robaxin for spasms  Incisional Care: Open to air. Wash incision daily with warm soapy water or shower daily, and pat dry with clean dry towel.   Hgb 11.9, keep >8 post-op.        Jane Priest PA-C

## 2025-03-10 ENCOUNTER — CARE COORDINATION (OUTPATIENT)
Dept: CASE MANAGEMENT | Age: 75
End: 2025-03-10

## 2025-03-10 DIAGNOSIS — M48.062 LUMBAR STENOSIS WITH NEUROGENIC CLAUDICATION: Primary | ICD-10-CM

## 2025-03-10 PROCEDURE — 1111F DSCHRG MED/CURRENT MED MERGE: CPT | Performed by: FAMILY MEDICINE

## 2025-03-11 ENCOUNTER — TELEPHONE (OUTPATIENT)
Dept: FAMILY MEDICINE CLINIC | Age: 75
End: 2025-03-11

## 2025-03-11 NOTE — CARE COORDINATION
CTN sent msg to [facility] liaison for HC verification - awaiting a response     BENIGNO MejiaN, RN  Care Transition Coordinator  Contact Number:  [phone]     
will continue to use assistive device to prevent fall /injury.    -Denies fever, chills, nausea, vomiting, cough, congestion, SOB / DB, wheezing, chest pain, LE edema / pain, feeling lightheaded / dizziness, heart palpitations / flutters, fatigue, weakness, and s/s of stroke.  -Denies issues with fluid intake (advised to follow fluid restriction recommended by physician d/t hx of HF), appetite, and urination  -Meds:  CTC and Pt reviewed meds and CTC completed the 1111f  -HFU with pcp - Emiliano Higgins MD - declined HFU saw recently for AWV   -HFU with surgeon - Dr House - 3/20 - pt plans to keep appt time  -home care:  [facility] - msg sent to  liaison for verification       Care Transition Nurse reviewed discharge instructions, medical action plan, and red flags with patient. The patient was given an opportunity to ask questions; all questions answered at this time.. The patient verbalized understanding.   Were discharge instructions available to patient? Yes.   Reviewed appropriate site of care based on symptoms and resources available to patient including: PCP  Specialist  After hours contact number-   Benefits related nurse triage line  Urgent care clinics  [facility]  When to call 911  MyChart Messaging  [facility] Help Desk for reactivation or family member permission [phone]. The patient agrees to contact the primary care provider and/or specialist office for questions related to their healthcare.      Advance Care Planning:   Does patient have an Advance Directive: not on file; education provided -   .    Medication Reconciliation:  Medication reconciliation was performed with patient,1111F entered: yes.     Remote Patient Monitoring:  Offered patient enrollment in the Remote Patient Monitoring (RPM) program for in-home monitoring: Patient is not eligible for RPM program because: patient does not have qualifying diagnosis.    Assessments:  Care Transitions 24 Hour Call    Do you have a copy of your

## 2025-03-12 ENCOUNTER — CARE COORDINATION (OUTPATIENT)
Dept: CASE MANAGEMENT | Age: 75
End: 2025-03-12

## 2025-03-12 NOTE — CARE COORDINATION
Care Transitions Note    Follow Up Call     Attempted to reach patient for transitions of care follow up.  Unable to reach .      Outreach Attempts:   HIPAA compliant voicemail left for patient.     Care Summary Note:     Follow Up Appointment:       Plan for follow-up call in 11-14 days based on severity of symptoms and risk factors. Plan for next call: symptom management-   self management-   follow-up appointment-   medication management-     Umer Martinez RN

## 2025-03-18 ENCOUNTER — CARE COORDINATION (OUTPATIENT)
Dept: CASE MANAGEMENT | Age: 75
End: 2025-03-18

## 2025-03-18 NOTE — CARE COORDINATION
Care Transitions Note    Follow Up Call     Patient Current Location:  Home: [address] [place] [id number]    Care Transition Nurse contacted the patient by telephone. Verified name and  as identifiers.    Additional needs identified to be addressed with provider   No needs identified                 Method of communication with provider: none.    Care Summary Note:      SUMMARY  CTN spoke to the pt who reports the following:       -\"Things are going well\"  -Lumbar fusion 3/5 with Dr GAVINO House:  rates pain to surgical site 3/10 - Taking Tylenol / PRN / effective - No longer taking Roxicodone / not needed - denies s/s if infection at surgical site - wearing brace as recommended - denies numbness / tingling to LE / feet -  continues muscle relaxer / Robaxin as prescribed - will continue to monitor and f/u wih physician as recommended   -LBM:  was today \"hard\" and continues taking Miralax and Senakot-S as recommended -  advised to increase fluid / fiber intake - will continue to monitor and f/u with physician if persist / worsens  -Assistive device:  using  walker to ambulate - will continue to use assistive device to prevent fall /injury.    -Denies fever, chills, nausea, vomiting, cough, congestion, SOB / DB, wheezing, chest pain, LE edema / pain, feeling lightheaded / dizziness, heart palpitations / flutters, falls, fatigue, weakness, and s/s of stroke.  -Denies issues with fluid intake, appetite, and urination  -Meds:    -HFU with pcp - Emiliano Higgins MD - declined HFU saw recently for AWV   -HFU with surgeon - Dr House - 3/20 - pt plans to keep appt time  -home care:  [facility] - received msg from liaison  order received        Plan of care updates since last contact:  Education:    Review of patient management of conditions/medications:    Home Health:     DME:         Advance Care Planning:   Does patient have an Advance Directive: reviewed during previous call, see note. .    Medication Review:  No

## 2025-03-21 ENCOUNTER — CARE COORDINATION (OUTPATIENT)
Dept: CASE MANAGEMENT | Age: 75
End: 2025-03-21

## 2025-03-21 NOTE — CARE COORDINATION
Care Transitions Note    Follow Up Call     Patient Current Location:  Home: [address] [place] [id number]    Care Transition Nurse contacted the patient by telephone. Verified name and  as identifiers.    Additional needs identified to be addressed with provider   No needs identified                 Method of communication with provider: none.    Care Summary Note:     SUMMARY  CTN spoke to the pt who reports the following:       -Lumbar fusion 3/5 with Dr GAVINO House: saw Nx Dr House yesterday for HFU \"everything going well\" - denies any issues / concerns at this time -  will continue to monitor and f/u wi physician as recommended   -LBM: yesterday and had no issues -  will continue Miralax and Senakot-S as needed -  advised to increase fluid / fiber intake  -  advised him to increase walking - will continue to monitor and f/u with physician as needed   -Assistive device:  using  walker to ambulate - will continue to use assistive device to prevent fall /injury.    -Denies fever, chills, nausea, vomiting, cough, congestion, SOB / DB, wheezing, chest pain, LE edema / pain, feeling lightheaded / dizziness, heart palpitations / flutters, falls, fatigue, weakness, and s/s of stroke.  -Denies issues with fluid intake, appetite, and urination  -HFU with pcp - Emiliano Higgins MD - declined HFU saw recently for AWV   -HFU with surgeon - Dr House - 3/20 - COMPLETED   -home care:  [facility] - active     Plan of care updates since last contact:  Education:    Review of patient management of conditions/medications:         Advance Care Planning:   Does patient have an Advance Directive: reviewed during previous call, see note. .    Medication Review:  No changes since last call.     Remote Patient Monitoring:  Offered patient enrollment in the Remote Patient Monitoring (RPM) program for in-home monitoring: Patient is not eligible for RPM program because: patient does not have qualifying diagnosis.    Assessments:  Care

## 2025-03-28 ENCOUNTER — CARE COORDINATION (OUTPATIENT)
Dept: CASE MANAGEMENT | Age: 75
End: 2025-03-28

## 2025-03-28 NOTE — CARE COORDINATION
Care Transitions Note    Follow Up Call     Patient Current Location:  Home: [address] [place] [id number]    Care Transition Nurse contacted the patient by telephone. Verified name and  as identifiers.    Additional needs identified to be addressed with provider   No needs identified                 Method of communication with provider: none.    Care Summary Note:       SUMMARY  CTN spoke to the pt who reports the following:       -Lumbar fusion 3/5 with Dr GAVINO Huose: Nx Dr House seen since d/c for HFU  - denies any issues / concerns at this time -  will continue to monitor and f/u [facility] physician as recommended   -LBM: denies any issues - will continue to monitor and f/u with physician as needed   -Assistive device:  using  walker to ambulate - will continue to use assistive device to prevent fall /injury.    -Denies fever, chills, nausea, vomiting, cough, congestion, SOB / DB, wheezing, chest pain, LE edema / pain, feeling lightheaded / dizziness, heart palpitations / flutters, falls, fatigue, weakness, and s/s of stroke.  -Denies issues with fluid intake, appetite, and urination  -HFU with pcp - Emiliano Higgins MD - declined HFU saw recently for AWV   -HFU with surgeon - Dr House - 3/20 - COMPLETED   -home care:  [facility] - active     Plan of care updates since last contact:  Education:    Review of patient management of conditions/medications:         Advance Care Planning:   Does patient have an Advance Directive: reviewed during previous call, see note. .    Medication Review:  No changes since last call.     Remote Patient Monitoring:  Offered patient enrollment in the Remote Patient Monitoring (RPM) program for in-home monitoring: Deferred at this time because  ; will discuss at next outreach.    Assessments:  Care Transitions Subsequent and Final Call    Subsequent and Final Calls  Do you have any ongoing symptoms?: No  Have your medications changed?: No  Do you have any questions related to your

## 2025-04-09 ENCOUNTER — CARE COORDINATION (OUTPATIENT)
Dept: CASE MANAGEMENT | Age: 75
End: 2025-04-09

## 2025-04-09 NOTE — CARE COORDINATION
Care Transitions Note    Final Call     Patient Current Location:  Home: [address] [place] [id number]    Care Transition Nurse contacted the patient by telephone. Verified name and  as identifiers.    Patient graduated from the Care Transitions program on 25   Patient/family verbalizes confidence in the ability to self-manage at this time. has the ability to self-manage at this time..      Advance Care Planning:   Does patient have an Advance Directive: reviewed during previous call, see note. .    Handoff:   Patient was not referred to the ACM team due to no additional needs identified.       Care Summary Note:       SUMMARY  CTN spoke to the pt who reports the following:       -Lumbar fusion 3/5 with Dr GAVINO House: rates pain 3/10 at this time - denies numbness / tingling to LE - wearing brace as recommended - next appt with neurology  -  will continue to monitor and f/u wih physician as recommended   -LBM: denies any issues at this time- will continue to monitor and f/u with physician as needed   -Assistive device:  no longer using  walker to ambulate - will continue to exercise caution when ambulating to prevent  fall /injury.    -Denies fever, chills, nausea, vomiting, cough, congestion, SOB / DB, wheezing, chest pain, LE edema / pain, feeling lightheaded / dizziness, heart palpitations / flutters, falls, fatigue, weakness, and s/s of stroke.  -Denies issues with fluid intake, appetite, and urination  -HFU with pcp - Emiliano Higgins MD - declined HFU saw recently for AWV   -HFU with surgeon - Dr House - 3/20 - COMPLETED - n/a   -home care:  On license of [facility] - active  -CT episode closed - CTN signed off pt.    Assessments:  Care Transitions Subsequent and Final Call    Subsequent and Final Calls  Do you have any ongoing symptoms?: Yes  Patient-reported symptoms: Other  Interventions for patient-reported symptoms: Other  Have your medications changed?: No  Do you have any questions related to your

## 2025-04-22 ENCOUNTER — HOSPITAL ENCOUNTER (OUTPATIENT)
Dept: PHYSICAL THERAPY | Age: 75
Setting detail: THERAPIES SERIES
Discharge: HOME | End: 2025-04-22
Payer: MEDICARE

## 2025-04-22 DIAGNOSIS — M54.50 LEFT-SIDED LOW BACK PAIN WITHOUT SCIATICA, UNSPECIFIED CHRONICITY: Primary | ICD-10-CM

## 2025-04-22 PROCEDURE — 97162 PT EVAL MOD COMPLEX 30 MIN: CPT

## 2025-04-22 PROCEDURE — 97112 NEUROMUSCULAR REEDUCATION: CPT

## 2025-04-22 NOTE — PLAN OF CARE
to maintain good posture and demonstrate good body mechanics with sitting, bending, and lifting  Reduced ability to sleep  Reduced ability or tolerance with driving and/or computer work  Reduced ability to perform lifting, reaching, carrying tasks  Reduced ability to forward bend  Reduced ability to ambulate prolonged functional periods/distances/surfaces    Participation Restrictions:   Reduced participation in home management   Reduced participation in social activities    Classification :   Signs/symptoms consistent with Lumbar instability//motor control subgroup.          Barriers to/and or personal factors that will affect rehab potential:   None noted    Physical Therapy Evaluation Complexity Justification  [x] A history of present problem and 1-2 personal factors and/or co-morbidities that impact the plan of care  [x] A total of 1-2 elements  found upon examination of body systems using standardized tests and measures addressing any of the following: body structures, functions (impairments), activity limitations, and/or participation restrictions  [x] A clinical presentation with stable and/or uncomplicated characteristics   [x] Clinical decision making of LOW (99117 - Typically 20 minutes face-to-face) complexity using standardized patient assessment instrument and/or measurable assessment of functional outcome.    Today's Assessment: See above    Medical Necessity Documentation:  I certify that this patient meets the below criteria necessary for medical necessity for care and/or justification of therapy services:  The patient has functional impairments and/or activity limitations and would benefit from continued outpatient therapy services to address the deficits outlined in the patients goals    Return to Play: NA    Prognosis for POC: [x] Good [] Fair  [] Poor    Patient requires continued skilled intervention: [x] Yes  [] No      CHARGE CAPTURE     PT CHARGE GRID   CPT Code (TIMED) minutes # CPT Code

## 2025-04-29 ENCOUNTER — APPOINTMENT (OUTPATIENT)
Dept: PHYSICAL THERAPY | Age: 75
End: 2025-04-29
Payer: MEDICARE

## 2025-05-02 ENCOUNTER — HOSPITAL ENCOUNTER (OUTPATIENT)
Dept: PHYSICAL THERAPY | Age: 75
Setting detail: THERAPIES SERIES
Discharge: HOME | End: 2025-05-02
Payer: MEDICARE

## 2025-05-02 PROCEDURE — 97110 THERAPEUTIC EXERCISES: CPT

## 2025-05-02 PROCEDURE — 97112 NEUROMUSCULAR REEDUCATION: CPT

## 2025-05-02 NOTE — FLOWSHEET NOTE
swelling/inflammation/restriction, allowing for proper ROM, static and dynamic balance, and kinesthetic sense and proprioception. Next visit plan to add new exercises Plan to add weight machines next session to progress patient back into gym.    Electronically Signed by Rhiannon Fisher PT, DPT  Date: 05/02/2025     Note: Portions of this note have been templated and/or copied from initial evaluation, reassessments and prior notes for documentation efficiency.    Note: If patient does not return for scheduled/recommended follow up visits, this note will serve as a discharge from care along with the most recent update on progress.

## 2025-05-07 ENCOUNTER — HOSPITAL ENCOUNTER (OUTPATIENT)
Dept: PHYSICAL THERAPY | Age: 75
Setting detail: THERAPIES SERIES
Discharge: HOME | End: 2025-05-07
Payer: MEDICARE

## 2025-05-07 PROCEDURE — 97110 THERAPEUTIC EXERCISES: CPT

## 2025-05-07 PROCEDURE — 97530 THERAPEUTIC ACTIVITIES: CPT

## 2025-05-07 NOTE — FLOWSHEET NOTE
Clamshell with Resistance  - 1 x daily - 7 x weekly - 3 sets - 10 reps  - Clamshell  - 1 x daily - 7 x weekly - 3 sets - 10 reps  - Seated Long Arc Quad  - 1 x daily - 7 x weekly - 3 sets - 10 reps    ASSESSMENT     Today's Assessment: During therapy this date, patient required verbal cueing, modification of technique, and progression of exercises and program for exercise progression, improving proper muscle recruitment and activation/motor control patterns, modulating pain, reduce/eliminate soft tissue swelling/inflammation/restriction, and improving soft tissue extensibility.Patient will continue to benefit from ongoing evaluation and advanced clinical decision from a Physical Therapist to address and improve pain control, ROM, muscle strength, neuromuscular control, endurance, balance and proprioception, ADL status, and proper body mechanics to safely return to PLOF and ADLs/IADLs without symptoms or restrictions.    Medical Necessity Documentation:  I certify that this patient meets the below criteria necessary for medical necessity for care and/or justification of therapy services:  The patient has functional impairments and/or activity limitations and would benefit from continued outpatient therapy services to address the deficits outlined in the patients goals    Return to Play: NA    Prognosis for POC: [x] Good [] Fair  [] Poor    Patient requires continued skilled intervention: [x] Yes  [] No      CHARGE CAPTURE     PT CHARGE GRID   CPT Code (TIMED) minutes # CPT Code (UNTIMED) #     Therex (52827)  15 1  EVAL:LOW (92567 - Typically 20 minutes face-to-face)     Neuromusc. Re-ed (07563) 25 2  Re-Eval (77967)     Manual (52696)    Estim Unattended (97070)     Ther. Act (50915)    [facility]. Traction (97972)     Gait (22681)    Dry Needle 1-2 muscle (20560)     Aquatic Therex (86348)    Dry Needle 3+ muscle (20561)     Iontophoresis (71365)    VASO (81260)     Ultrasound (58099)    Group Therapy (56669)     Estim

## 2025-05-16 ENCOUNTER — HOSPITAL ENCOUNTER (OUTPATIENT)
Dept: PHYSICAL THERAPY | Age: 75
Setting detail: THERAPIES SERIES
Discharge: HOME | End: 2025-05-16
Payer: MEDICARE

## 2025-05-16 PROCEDURE — 97112 NEUROMUSCULAR REEDUCATION: CPT

## 2025-05-16 PROCEDURE — 97530 THERAPEUTIC ACTIVITIES: CPT

## 2025-05-16 PROCEDURE — 97110 THERAPEUTIC EXERCISES: CPT

## 2025-05-16 NOTE — FLOWSHEET NOTE
[facility] - [facility] Therapy: [address], [place] [address] office: [phone] fax: [phone]      Physical Therapy: TREATMENT/PROGRESS NOTE   Patient: Brendon Bender (75 y.o. male)   Examination Date: 2025   :  1950 MRN: [id number]   Visit #: 4   Insurance Allowable Auth Needed   cap []Yes    [x]No    Insurance: Payor: MEDICARE / Plan: MEDICARE PART A AND B / Product Type: *No Product type* /   Insurance ID: [id number] - (Medicare)  Secondary Insurance (if applicable): [facility]   Treatment Diagnosis:     ICD-10-CM    1. Left-sided low back pain without sciatica, unspecified chronicity  M54.50          Medical Diagnosis:  Spinal stenosis, lumbar region with neurogenic claudication [M48.062]   Referring Physician: Susie Albright*  PCP: Emiliano Higgins MD     Plan of care signed (Y/N):     Date of Patient follow up with Physician:      Plan of Care Report: NO  POC update due: (10 visits /OR AUTH LIMITS, whichever is less)  2025 PROGRESS NOTE NPV                                            Medical History:  Comorbidities:  Prior Surgeries: lumbar fusion  Relevant Medical History:                                          Precautions/ Contra-indications:           Latex allergy:  NO  Pacemaker:    NO  Contraindications for Manipulation: None and remote history of spinal fusion (relative)  Date of Surgery: 3/5/25  Other:    Red Flags:  None    Suicide Screening:   The patient did not verbalize a primary behavioral concern, suicidal ideation, suicidal intent, or demonstrate suicidal behaviors.    Preferred Language for Healthcare:   [x] English       [] other:    SUBJECTIVE EXAMINATION     Patient stated complaint: Patient states that he is doing well. He states that he hasn't had any issues.        Test used Initial score  2025   Pain Summary VAS 3-5/10 1-2/10   Functional questionnaire [place]

## 2025-05-20 ENCOUNTER — HOSPITAL ENCOUNTER (OUTPATIENT)
Dept: PHYSICAL THERAPY | Age: 75
Setting detail: THERAPIES SERIES
Discharge: HOME | End: 2025-05-20
Payer: MEDICARE

## 2025-05-20 PROCEDURE — 97110 THERAPEUTIC EXERCISES: CPT

## 2025-05-20 PROCEDURE — 97530 THERAPEUTIC ACTIVITIES: CPT

## 2025-05-20 NOTE — FLOWSHEET NOTE
[facility] - [facility] Therapy: [address], [place] [address] office: [phone] fax: [phone]      Physical Therapy: TREATMENT/PROGRESS NOTE   Patient: Brendon Bender (75 y.o. male)   Examination Date: 2025   :  1950 MRN: [id number]   Visit #: 5   Insurance Allowable Auth Needed   cap []Yes    [x]No    Insurance: Payor: MEDICARE / Plan: MEDICARE PART A AND B / Product Type: *No Product type* /   Insurance ID: [id number] - (Medicare)  Secondary Insurance (if applicable): [facility]   Treatment Diagnosis:     ICD-10-CM    1. Left-sided low back pain without sciatica, unspecified chronicity  M54.50          Medical Diagnosis:  Spinal stenosis, lumbar region with neurogenic claudication [M48.062]   Referring Physician: Susie Albright*  PCP: Emiliano Higgins MD     Plan of care signed (Y/N):     Date of Patient follow up with Physician:      Plan of Care Report: NO  POC update due: (10 visits /OR AUTH LIMITS, whichever is less)  2025 PROGRESS NOTE NPV                                            Medical History:  Comorbidities:  Prior Surgeries: lumbar fusion  Relevant Medical History:                                          Precautions/ Contra-indications:           Latex allergy:  NO  Pacemaker:    NO  Contraindications for Manipulation: None and remote history of spinal fusion (relative)  Date of Surgery: 3/5/25  Other:    Red Flags:  None    Suicide Screening:   The patient did not verbalize a primary behavioral concern, suicidal ideation, suicidal intent, or demonstrate suicidal behaviors.    Preferred Language for Healthcare:   [x] English       [] other:    SUBJECTIVE EXAMINATION     Patient stated complaint: Patient states that his back is feeling and is going to the gym after therapy today.        Test used Initial score  2025   Pain Summary VAS 3-5/10 1-2/10   Functional questionnaire [place]

## 2025-05-30 ENCOUNTER — APPOINTMENT (OUTPATIENT)
Dept: PHYSICAL THERAPY | Age: 75
End: 2025-05-30
Payer: MEDICARE

## 2025-06-05 ENCOUNTER — HOSPITAL ENCOUNTER (OUTPATIENT)
Dept: MRI IMAGING | Age: 75
Discharge: HOME | End: 2025-06-05
Payer: MEDICARE

## 2025-06-05 DIAGNOSIS — Z98.1 ARTHRODESIS STATUS: ICD-10-CM

## 2025-06-05 PROCEDURE — 6360000004 HC RX CONTRAST MEDICATION: Performed by: NEUROLOGICAL SURGERY

## 2025-06-05 PROCEDURE — A9579 GAD-BASE MR CONTRAST NOS,1ML: HCPCS | Performed by: NEUROLOGICAL SURGERY

## 2025-06-05 PROCEDURE — 72158 MRI LUMBAR SPINE W/O & W/DYE: CPT

## 2025-06-05 RX ADMIN — GADOTERIDOL 16 ML: 279.3 INJECTION, SOLUTION INTRAVENOUS at 10:39

## 2025-08-11 ENCOUNTER — OFFICE VISIT (OUTPATIENT)
Dept: FAMILY MEDICINE CLINIC | Age: 75
End: 2025-08-11

## 2025-08-11 VITALS
TEMPERATURE: 97.2 F | SYSTOLIC BLOOD PRESSURE: 128 MMHG | HEIGHT: 67 IN | WEIGHT: 177 LBS | DIASTOLIC BLOOD PRESSURE: 68 MMHG | HEART RATE: 70 BPM | OXYGEN SATURATION: 98 % | BODY MASS INDEX: 27.78 KG/M2

## 2025-08-11 DIAGNOSIS — E78.2 MIXED HYPERLIPIDEMIA: ICD-10-CM

## 2025-08-11 DIAGNOSIS — L23.7 POISON IVY DERMATITIS: Primary | ICD-10-CM

## 2025-08-11 DIAGNOSIS — M48.061 SPINAL STENOSIS OF LUMBAR REGION WITHOUT NEUROGENIC CLAUDICATION: ICD-10-CM

## 2025-08-11 RX ORDER — GABAPENTIN 300 MG/1
300 CAPSULE ORAL 3 TIMES DAILY
Qty: 90 CAPSULE | Refills: 0 | Status: SHIPPED | OUTPATIENT
Start: 2025-08-11 | End: 2025-09-10

## 2025-08-11 RX ORDER — TRIAMCINOLONE ACETONIDE 1 MG/G
CREAM TOPICAL
Qty: 45 G | Refills: 0 | Status: SHIPPED | OUTPATIENT
Start: 2025-08-11

## 2025-08-11 RX ORDER — ATORVASTATIN CALCIUM 20 MG/1
20 TABLET, FILM COATED ORAL DAILY
Qty: 90 TABLET | Refills: 3 | Status: SHIPPED | OUTPATIENT
Start: 2025-08-11

## 2025-08-11 RX ORDER — PREDNISONE 10 MG/1
TABLET ORAL
Qty: 20 TABLET | Refills: 0 | Status: SHIPPED | OUTPATIENT
Start: 2025-08-11

## 2025-08-13 DIAGNOSIS — E78.2 MIXED HYPERLIPIDEMIA: ICD-10-CM

## 2025-08-13 LAB
ALBUMIN SERPL-MCNC: 4.2 G/DL (ref 3.4–5)
ALBUMIN/GLOB SERPL: 1.8 {RATIO} (ref 1.1–2.2)
ALP SERPL-CCNC: 76 U/L (ref 40–129)
ALT SERPL-CCNC: 17 U/L (ref 10–40)
ANION GAP SERPL CALCULATED.3IONS-SCNC: 12 MMOL/L (ref 3–16)
AST SERPL-CCNC: 23 U/L (ref 15–37)
BILIRUB SERPL-MCNC: 0.8 MG/DL (ref 0–1)
BUN SERPL-MCNC: 14 MG/DL (ref 7–20)
CALCIUM SERPL-MCNC: 9.2 MG/DL (ref 8.3–10.6)
CHLORIDE SERPL-SCNC: 102 MMOL/L (ref 99–110)
CHOLEST SERPL-MCNC: 182 MG/DL (ref 0–199)
CO2 SERPL-SCNC: 23 MMOL/L (ref 21–32)
CREAT SERPL-MCNC: 1.1 MG/DL (ref 0.8–1.3)
GFR SERPLBLD CREATININE-BSD FMLA CKD-EPI: 71 ML/MIN/{1.73_M2}
GLUCOSE SERPL-MCNC: 97 MG/DL (ref 70–99)
HDLC SERPL-MCNC: 62 MG/DL (ref 40–60)
LDLC SERPL CALC-MCNC: 95 MG/DL
POTASSIUM SERPL-SCNC: 4.7 MMOL/L (ref 3.5–5.1)
PROT SERPL-MCNC: 6.5 G/DL (ref 6.4–8.2)
SODIUM SERPL-SCNC: 137 MMOL/L (ref 136–145)
TRIGL SERPL-MCNC: 123 MG/DL (ref 0–150)
VLDLC SERPL CALC-MCNC: 25 MG/DL

## (undated) DEVICE — 3M™ TEGADERM™ TRANSPARENT FILM DRESSING FRAME STYLE, 1626W, 4 IN X 4-3/4 IN (10 CM X 12 CM), 50/CT 4CT/CASE: Brand: 3M™ TEGADERM™

## (undated) DEVICE — MODULE EMG W/ NVM5 HARN 2 NEEDLE/NEUROVISION 2 SLD GEL

## (undated) DEVICE — BLANKET WRM W29.9XL79.1IN UP BODY FORC AIR MISTRAL-AIR

## (undated) DEVICE — SYRINGE MED 10ML LUERLOCK TIP W/O SFTY DISP

## (undated) DEVICE — NEPTUNE E-SEP SMOKE EVACUATION PENCIL, COATED, 70MM BLADE, PUSH BUTTON SWITCH: Brand: NEPTUNE E-SEP

## (undated) DEVICE — SUTURE VICRYL + SZ 0 L18IN ABSRB UD L36MM CT-1 1/2 CIR VCP840D

## (undated) DEVICE — RESERVOIR,SUCTION,100CC,SILICONE: Brand: MEDLINE

## (undated) DEVICE — ORTHO PRE OP PACK: Brand: MEDLINE INDUSTRIES, INC.

## (undated) DEVICE — COVER LT HNDL CAM BLU DISP W/ SURG CTRL

## (undated) DEVICE — GLOVE SURG SZ 65 L12IN FNGR THK79MIL GRN LTX FREE

## (undated) DEVICE — SUTURE STRATAFIX SYMMETRIC PDS + SZ 1 L18IN ABSRB VLT L48MM SXPP1A400

## (undated) DEVICE — SPONGE,NEURO,0.5"X0.5",XR,STRL,10/PK: Brand: MEDLINE

## (undated) DEVICE — SOLUTION SURG PREP 26 CC PURPREP

## (undated) DEVICE — GLOVE SURG SZ 8 L11.77IN FNGR THK11.8MIL STRW LTX POLYMER

## (undated) DEVICE — KIT SPNL ACC MAXCESS IV

## (undated) DEVICE — DRAIN SURG FLAT W7MMXL20CM FULL PERF

## (undated) DEVICE — ADHESIVE SKIN CLOSURE WND 8.661X1.5 IN 22 CM LIQUIBAND SECUR

## (undated) DEVICE — SPONGE,NEURO,0.5"X3",XR,STRL,LF,10/PK: Brand: MEDLINE

## (undated) DEVICE — C-ARMOR C-ARM EQUIPMENT COVERS CLEAR STERILE UNIVERSAL FIT 12 PER CASE: Brand: C-ARMOR

## (undated) DEVICE — AGENT HEMOSTATIC SURGIFLOW MATRIX KIT W/THROMBIN

## (undated) DEVICE — GLOVE SURG SZ 65 THK91MIL LTX FREE SYN POLYISOPRENE

## (undated) DEVICE — GLOVE SURG SZ 85 L12IN FNGR THK79MIL GRN LTX FREE

## (undated) DEVICE — SUTURE STRATAFIX SYMMETRIC SZ 1 L18IN ABSRB VLT CT1 L36CM SXPP1A404

## (undated) DEVICE — SUTURE MONOCRYL + SZ 4-0 L27IN ABSRB UD L19MM PS-2 3/8 CIR MCP426H

## (undated) DEVICE — NEEDLE VERTEBROPLASTY 10GA L4.721IN YEL DMND TIP RADPQ

## (undated) DEVICE — 3M™ IOBAN™ 2 ANTIMICROBIAL INCISE DRAPE 6640EZ: Brand: IOBAN™ 2

## (undated) DEVICE — DRAPE MICSCP W54XL150IN W/ 4 BINOC GLS LENS LEICA

## (undated) DEVICE — PAD,NON-ADHERENT,3X8,STERILE,LF,1/PK: Brand: MEDLINE

## (undated) DEVICE — TOWEL,STOP FLAG GOLD N-W: Brand: MEDLINE

## (undated) DEVICE — ELECTRODE PT RET AD L9FT HI MOIST COND ADH HYDRGEL CORDED

## (undated) DEVICE — DRAIN SURG 15FR SIL RND CHN W/ TRCR FULL FLUT DBL WRP TRAD

## (undated) DEVICE — TRAP FLUID

## (undated) DEVICE — NEEDLE,22GX1.5",REG,BEVEL: Brand: MEDLINE

## (undated) DEVICE — EYE PROTECTOR FOAM MEDICHOICE

## (undated) DEVICE — GARMENT,MEDLINE,DVT,INT,CALF,MED, GEN2: Brand: MEDLINE

## (undated) DEVICE — SUTURE VICRYL + SZ 3-0 L18IN ABSRB UD SH 1/2 CIR TAPERCUT NDL VCP864D

## (undated) DEVICE — SOLUTION IV 1000ML 0.9% SOD CHL

## (undated) DEVICE — MARKER SURG PASS SPHR NDI

## (undated) DEVICE — GLOVE SURG SZ 7 L12IN FNGR THK79MIL GRN LTX FREE

## (undated) DEVICE — TOOL MR8-14MH30 MR8 14CM MATCH 3MM: Brand: MIDAS REX MR8

## (undated) DEVICE — GLOVE SURG SZ 7 CRM LTX FREE POLYISOPRENE POLYMER BEAD ANTI

## (undated) DEVICE — SUTURE VICRYL SZ 2-0 L18IN ABSRB UD CT-1 L36MM 1/2 CIR J839D

## (undated) DEVICE — 3M™ TEGADERM™ CHG CHLORHEXIDINE GLUCONATE GEL PAD 1664, 25 EACH/CARTON, 4 CARTONS/CASE: Brand: 3M™ TEGADERM™

## (undated) DEVICE — GLOVE SURG SZ 85 L12IN FNGR ORTHO 126MIL CRM LTX FREE

## (undated) DEVICE — LIQUIBAND RAPID ADHESIVE 36/CS 0.8ML: Brand: MEDLINE

## (undated) DEVICE — PACK,UNIVERSAL,NO GOWNS: Brand: MEDLINE

## (undated) DEVICE — LAMINECTOMY: Brand: MEDLINE INDUSTRIES, INC.

## (undated) DEVICE — KIT DIL PRB K WIRE DISP FOR EXTRM LUM INTBDY FUS